# Patient Record
Sex: MALE | Race: BLACK OR AFRICAN AMERICAN | NOT HISPANIC OR LATINO | URBAN - METROPOLITAN AREA
[De-identification: names, ages, dates, MRNs, and addresses within clinical notes are randomized per-mention and may not be internally consistent; named-entity substitution may affect disease eponyms.]

---

## 2024-07-22 ENCOUNTER — CLINICAL SUPPORT (OUTPATIENT)
Dept: EMERGENCY MEDICINE | Facility: HOSPITAL | Age: 54
End: 2024-07-22

## 2024-07-22 ENCOUNTER — HOSPITAL ENCOUNTER (INPATIENT)
Facility: HOSPITAL | Age: 54
LOS: 1 days | Discharge: HOME | End: 2024-07-23
Attending: EMERGENCY MEDICINE | Admitting: PSYCHIATRY & NEUROLOGY

## 2024-07-22 ENCOUNTER — APPOINTMENT (OUTPATIENT)
Dept: RADIOLOGY | Facility: HOSPITAL | Age: 54
End: 2024-07-22

## 2024-07-22 ENCOUNTER — APPOINTMENT (OUTPATIENT)
Dept: RADIOLOGY | Facility: HOSPITAL | Age: 54
End: 2024-07-22
Payer: MEDICARE

## 2024-07-22 DIAGNOSIS — I63.531: ICD-10-CM

## 2024-07-22 DIAGNOSIS — I42.9 CARDIOMYOPATHY, UNSPECIFIED TYPE (MULTI): ICD-10-CM

## 2024-07-22 DIAGNOSIS — I63.9 ARTERIAL ISCHEMIC STROKE (MULTI): Primary | ICD-10-CM

## 2024-07-22 LAB
ALBUMIN SERPL BCP-MCNC: 4.1 G/DL (ref 3.4–5)
ALP SERPL-CCNC: 96 U/L (ref 33–120)
ALT SERPL W P-5'-P-CCNC: 6 U/L (ref 10–52)
ANION GAP BLDV CALCULATED.4IONS-SCNC: 12 MMOL/L (ref 10–25)
ANION GAP SERPL CALC-SCNC: 14 MMOL/L (ref 10–20)
APTT PPP: 27 SECONDS (ref 27–38)
AST SERPL W P-5'-P-CCNC: 13 U/L (ref 9–39)
BASE EXCESS BLDV CALC-SCNC: 2.4 MMOL/L (ref -2–3)
BASOPHILS # BLD AUTO: 0.04 X10*3/UL (ref 0–0.1)
BASOPHILS NFR BLD AUTO: 0.5 %
BILIRUB SERPL-MCNC: 1.2 MG/DL (ref 0–1.2)
BODY TEMPERATURE: 37 DEGREES CELSIUS
BUN SERPL-MCNC: 14 MG/DL (ref 6–23)
CA-I BLDV-SCNC: 1.16 MMOL/L (ref 1.1–1.33)
CALCIUM SERPL-MCNC: 9.4 MG/DL (ref 8.6–10.6)
CARDIAC TROPONIN I PNL SERPL HS: 35 NG/L (ref 0–53)
CHLORIDE BLDV-SCNC: 104 MMOL/L (ref 98–107)
CHLORIDE SERPL-SCNC: 105 MMOL/L (ref 98–107)
CO2 SERPL-SCNC: 23 MMOL/L (ref 21–32)
CREAT SERPL-MCNC: 1.69 MG/DL (ref 0.5–1.3)
EGFRCR SERPLBLD CKD-EPI 2021: 48 ML/MIN/1.73M*2
EOSINOPHIL # BLD AUTO: 0.07 X10*3/UL (ref 0–0.7)
EOSINOPHIL NFR BLD AUTO: 0.9 %
ERYTHROCYTE [DISTWIDTH] IN BLOOD BY AUTOMATED COUNT: 16.6 % (ref 11.5–14.5)
ERYTHROCYTE [SEDIMENTATION RATE] IN BLOOD BY WESTERGREN METHOD: 11 MM/H (ref 0–20)
GLUCOSE BLD MANUAL STRIP-MCNC: 93 MG/DL (ref 74–99)
GLUCOSE BLDV-MCNC: 94 MG/DL (ref 74–99)
GLUCOSE SERPL-MCNC: 93 MG/DL (ref 74–99)
HCO3 BLDV-SCNC: 27.2 MMOL/L (ref 22–26)
HCT VFR BLD AUTO: 39.8 % (ref 41–52)
HCT VFR BLD EST: 41 % (ref 41–52)
HGB BLD-MCNC: 13.4 G/DL (ref 13.5–17.5)
HGB BLDV-MCNC: 13.8 G/DL (ref 13.5–17.5)
IMM GRANULOCYTES # BLD AUTO: 0.01 X10*3/UL (ref 0–0.7)
IMM GRANULOCYTES NFR BLD AUTO: 0.1 % (ref 0–0.9)
INR PPP: 1 (ref 0.9–1.1)
LACTATE BLDV-SCNC: 1.4 MMOL/L (ref 0.4–2)
LYMPHOCYTES # BLD AUTO: 3.34 X10*3/UL (ref 1.2–4.8)
LYMPHOCYTES NFR BLD AUTO: 40.7 %
MCH RBC QN AUTO: 27.3 PG (ref 26–34)
MCHC RBC AUTO-ENTMCNC: 33.7 G/DL (ref 32–36)
MCV RBC AUTO: 81 FL (ref 80–100)
MONOCYTES # BLD AUTO: 0.87 X10*3/UL (ref 0.1–1)
MONOCYTES NFR BLD AUTO: 10.6 %
NEUTROPHILS # BLD AUTO: 3.87 X10*3/UL (ref 1.2–7.7)
NEUTROPHILS NFR BLD AUTO: 47.2 %
NRBC BLD-RTO: 0 /100 WBCS (ref 0–0)
OXYHGB MFR BLDV: 63.3 % (ref 45–75)
PCO2 BLDV: 42 MM HG (ref 41–51)
PH BLDV: 7.42 PH (ref 7.33–7.43)
PLATELET # BLD AUTO: 249 X10*3/UL (ref 150–450)
PO2 BLDV: 39 MM HG (ref 35–45)
POTASSIUM BLDV-SCNC: 3.7 MMOL/L (ref 3.5–5.3)
POTASSIUM SERPL-SCNC: 3.5 MMOL/L (ref 3.5–5.3)
PROT SERPL-MCNC: 7.8 G/DL (ref 6.4–8.2)
PROTHROMBIN TIME: 11.3 SECONDS (ref 9.8–12.8)
RBC # BLD AUTO: 4.9 X10*6/UL (ref 4.5–5.9)
SAO2 % BLDV: 64 % (ref 45–75)
SODIUM BLDV-SCNC: 139 MMOL/L (ref 136–145)
SODIUM SERPL-SCNC: 138 MMOL/L (ref 136–145)
WBC # BLD AUTO: 8.2 X10*3/UL (ref 4.4–11.3)

## 2024-07-22 PROCEDURE — 85730 THROMBOPLASTIN TIME PARTIAL: CPT | Performed by: STUDENT IN AN ORGANIZED HEALTH CARE EDUCATION/TRAINING PROGRAM

## 2024-07-22 PROCEDURE — 70450 CT HEAD/BRAIN W/O DYE: CPT | Mod: 59

## 2024-07-22 PROCEDURE — 71045 X-RAY EXAM CHEST 1 VIEW: CPT

## 2024-07-22 PROCEDURE — 85610 PROTHROMBIN TIME: CPT | Performed by: STUDENT IN AN ORGANIZED HEALTH CARE EDUCATION/TRAINING PROGRAM

## 2024-07-22 PROCEDURE — 85025 COMPLETE CBC W/AUTO DIFF WBC: CPT | Performed by: STUDENT IN AN ORGANIZED HEALTH CARE EDUCATION/TRAINING PROGRAM

## 2024-07-22 PROCEDURE — 70498 CT ANGIOGRAPHY NECK: CPT

## 2024-07-22 PROCEDURE — 84132 ASSAY OF SERUM POTASSIUM: CPT

## 2024-07-22 PROCEDURE — 82947 ASSAY GLUCOSE BLOOD QUANT: CPT | Mod: 59

## 2024-07-22 PROCEDURE — 82947 ASSAY GLUCOSE BLOOD QUANT: CPT

## 2024-07-22 PROCEDURE — 99291 CRITICAL CARE FIRST HOUR: CPT | Performed by: EMERGENCY MEDICINE

## 2024-07-22 PROCEDURE — 70498 CT ANGIOGRAPHY NECK: CPT | Performed by: RADIOLOGY

## 2024-07-22 PROCEDURE — 36415 COLL VENOUS BLD VENIPUNCTURE: CPT | Performed by: STUDENT IN AN ORGANIZED HEALTH CARE EDUCATION/TRAINING PROGRAM

## 2024-07-22 PROCEDURE — 93005 ELECTROCARDIOGRAM TRACING: CPT

## 2024-07-22 PROCEDURE — 84132 ASSAY OF SERUM POTASSIUM: CPT | Mod: 59 | Performed by: STUDENT IN AN ORGANIZED HEALTH CARE EDUCATION/TRAINING PROGRAM

## 2024-07-22 PROCEDURE — 84484 ASSAY OF TROPONIN QUANT: CPT | Performed by: STUDENT IN AN ORGANIZED HEALTH CARE EDUCATION/TRAINING PROGRAM

## 2024-07-22 PROCEDURE — 93010 ELECTROCARDIOGRAM REPORT: CPT | Performed by: EMERGENCY MEDICINE

## 2024-07-22 PROCEDURE — 2550000001 HC RX 255 CONTRASTS: Performed by: EMERGENCY MEDICINE

## 2024-07-22 PROCEDURE — 99291 CRITICAL CARE FIRST HOUR: CPT | Mod: 25 | Performed by: EMERGENCY MEDICINE

## 2024-07-22 PROCEDURE — 85652 RBC SED RATE AUTOMATED: CPT | Performed by: EMERGENCY MEDICINE

## 2024-07-22 PROCEDURE — 70496 CT ANGIOGRAPHY HEAD: CPT | Performed by: RADIOLOGY

## 2024-07-22 RX ADMIN — IOHEXOL 90 ML: 350 INJECTION, SOLUTION INTRAVENOUS at 22:02

## 2024-07-22 ASSESSMENT — PAIN SCALES - GENERAL
PAINLEVEL_OUTOF10: 3
PAINLEVEL_OUTOF10: 4

## 2024-07-22 ASSESSMENT — PAIN - FUNCTIONAL ASSESSMENT: PAIN_FUNCTIONAL_ASSESSMENT: 0-10

## 2024-07-22 ASSESSMENT — PAIN DESCRIPTION - PAIN TYPE: TYPE: ACUTE PAIN

## 2024-07-22 ASSESSMENT — PAIN DESCRIPTION - LOCATION: LOCATION: HEAD

## 2024-07-23 ENCOUNTER — PHARMACY VISIT (OUTPATIENT)
Dept: PHARMACY | Facility: CLINIC | Age: 54
End: 2024-07-23
Payer: COMMERCIAL

## 2024-07-23 ENCOUNTER — APPOINTMENT (OUTPATIENT)
Dept: CARDIOLOGY | Facility: HOSPITAL | Age: 54
End: 2024-07-23

## 2024-07-23 ENCOUNTER — APPOINTMENT (OUTPATIENT)
Dept: RADIOLOGY | Facility: HOSPITAL | Age: 54
End: 2024-07-23

## 2024-07-23 ENCOUNTER — CLINICAL SUPPORT (OUTPATIENT)
Dept: EMERGENCY MEDICINE | Facility: HOSPITAL | Age: 54
End: 2024-07-23

## 2024-07-23 VITALS
TEMPERATURE: 98.4 F | HEART RATE: 79 BPM | WEIGHT: 250 LBS | HEIGHT: 73 IN | RESPIRATION RATE: 18 BRPM | DIASTOLIC BLOOD PRESSURE: 115 MMHG | SYSTOLIC BLOOD PRESSURE: 165 MMHG | BODY MASS INDEX: 33.13 KG/M2 | OXYGEN SATURATION: 95 %

## 2024-07-23 PROBLEM — I26.99 PULMONARY EMBOLISM (MULTI): Status: ACTIVE | Noted: 2024-07-23

## 2024-07-23 PROBLEM — G45.3 AMAUROSIS FUGAX: Status: ACTIVE | Noted: 2024-07-23

## 2024-07-23 PROBLEM — I42.9 CARDIOMYOPATHY (MULTI): Status: ACTIVE | Noted: 2024-07-23

## 2024-07-23 PROBLEM — I69.351 HEMIPARESIS AFFECTING RIGHT SIDE AS LATE EFFECT OF CEREBROVASCULAR ACCIDENT (CVA) (MULTI): Status: ACTIVE | Noted: 2024-07-23

## 2024-07-23 PROBLEM — G45.9 TIA (TRANSIENT ISCHEMIC ATTACK): Status: ACTIVE | Noted: 2024-07-23

## 2024-07-23 PROBLEM — I69.920 APHASIA DUE TO LATE EFFECTS OF CEREBROVASCULAR DISEASE: Status: ACTIVE | Noted: 2024-07-23

## 2024-07-23 PROBLEM — I63.532: Status: ACTIVE | Noted: 2024-07-23

## 2024-07-23 PROBLEM — I69.398 SEIZURE, LATE EFFECT OF STROKE (MULTI): Status: ACTIVE | Noted: 2024-07-23

## 2024-07-23 PROBLEM — I10 HYPERTENSION: Status: ACTIVE | Noted: 2024-07-23

## 2024-07-23 PROBLEM — R56.9 SEIZURE, LATE EFFECT OF STROKE (MULTI): Status: ACTIVE | Noted: 2024-07-23

## 2024-07-23 PROBLEM — I63.9 ARTERIAL ISCHEMIC STROKE (MULTI): Status: ACTIVE | Noted: 2024-07-23

## 2024-07-23 LAB
ALBUMIN SERPL BCP-MCNC: 3.5 G/DL (ref 3.4–5)
ALP SERPL-CCNC: 81 U/L (ref 33–120)
ALT SERPL W P-5'-P-CCNC: 8 U/L (ref 10–52)
AORTIC VALVE MEAN GRADIENT: 2.8 MMHG
AORTIC VALVE PEAK VELOCITY: 1.15 M/S
AST SERPL W P-5'-P-CCNC: 19 U/L (ref 9–39)
AV PEAK GRADIENT: 5.3 MMHG
AVA (PEAK VEL): 2.42 CM2
AVA (VTI): 2.24 CM2
BILIRUB DIRECT SERPL-MCNC: 0.1 MG/DL (ref 0–0.3)
BILIRUB SERPL-MCNC: 1.1 MG/DL (ref 0–1.2)
BNP SERPL-MCNC: 112 PG/ML (ref 0–99)
CARDIAC TROPONIN I PNL SERPL HS: 36 NG/L (ref 0–53)
CHOLEST SERPL-MCNC: 149 MG/DL (ref 0–199)
CHOLESTEROL/HDL RATIO: 4.5
EJECTION FRACTION APICAL 4 CHAMBER: 56.8
EJECTION FRACTION: 33 %
EST. AVERAGE GLUCOSE BLD GHB EST-MCNC: 131 MG/DL
GLUCOSE BLD MANUAL STRIP-MCNC: 105 MG/DL (ref 74–99)
HBA1C MFR BLD: 6.2 %
HDLC SERPL-MCNC: 32.9 MG/DL
LDLC SERPL CALC-MCNC: 102 MG/DL
LEFT ATRIUM VOLUME AREA LENGTH INDEX BSA: 31.8 ML/M2
LEFT VENTRICLE INTERNAL DIMENSION DIASTOLE: 6.51 CM (ref 3.5–6)
LEFT VENTRICULAR OUTFLOW TRACT DIAMETER: 2.14 CM
MITRAL VALVE E/A RATIO: 0.47
NON HDL CHOLESTEROL: 116 MG/DL (ref 0–149)
PROT SERPL-MCNC: 6.9 G/DL (ref 6.4–8.2)
RIGHT VENTRICLE FREE WALL PEAK S': 0.14 CM/S
TRICUSPID ANNULAR PLANE SYSTOLIC EXCURSION: 2.7 CM
TRIGL SERPL-MCNC: 72 MG/DL (ref 0–149)
VLDL: 14 MG/DL (ref 0–40)

## 2024-07-23 PROCEDURE — 2500000004 HC RX 250 GENERAL PHARMACY W/ HCPCS (ALT 636 FOR OP/ED)

## 2024-07-23 PROCEDURE — 70544 MR ANGIOGRAPHY HEAD W/O DYE: CPT

## 2024-07-23 PROCEDURE — 36415 COLL VENOUS BLD VENIPUNCTURE: CPT

## 2024-07-23 PROCEDURE — 80076 HEPATIC FUNCTION PANEL: CPT

## 2024-07-23 PROCEDURE — 99223 1ST HOSP IP/OBS HIGH 75: CPT | Performed by: STUDENT IN AN ORGANIZED HEALTH CARE EDUCATION/TRAINING PROGRAM

## 2024-07-23 PROCEDURE — 70551 MRI BRAIN STEM W/O DYE: CPT

## 2024-07-23 PROCEDURE — 70547 MR ANGIOGRAPHY NECK W/O DYE: CPT

## 2024-07-23 PROCEDURE — 2500000001 HC RX 250 WO HCPCS SELF ADMINISTERED DRUGS (ALT 637 FOR MEDICARE OP)

## 2024-07-23 PROCEDURE — 70547 MR ANGIOGRAPHY NECK W/O DYE: CPT | Performed by: STUDENT IN AN ORGANIZED HEALTH CARE EDUCATION/TRAINING PROGRAM

## 2024-07-23 PROCEDURE — 2500000002 HC RX 250 W HCPCS SELF ADMINISTERED DRUGS (ALT 637 FOR MEDICARE OP, ALT 636 FOR OP/ED)

## 2024-07-23 PROCEDURE — 1200000002 HC GENERAL ROOM WITH TELEMETRY DAILY

## 2024-07-23 PROCEDURE — G0378 HOSPITAL OBSERVATION PER HR: HCPCS

## 2024-07-23 PROCEDURE — 83718 ASSAY OF LIPOPROTEIN: CPT

## 2024-07-23 PROCEDURE — 83880 ASSAY OF NATRIURETIC PEPTIDE: CPT

## 2024-07-23 PROCEDURE — 84484 ASSAY OF TROPONIN QUANT: CPT

## 2024-07-23 PROCEDURE — 71045 X-RAY EXAM CHEST 1 VIEW: CPT | Performed by: RADIOLOGY

## 2024-07-23 PROCEDURE — RXMED WILLOW AMBULATORY MEDICATION CHARGE

## 2024-07-23 PROCEDURE — 70551 MRI BRAIN STEM W/O DYE: CPT | Performed by: STUDENT IN AN ORGANIZED HEALTH CARE EDUCATION/TRAINING PROGRAM

## 2024-07-23 PROCEDURE — 83036 HEMOGLOBIN GLYCOSYLATED A1C: CPT

## 2024-07-23 PROCEDURE — 93005 ELECTROCARDIOGRAM TRACING: CPT

## 2024-07-23 PROCEDURE — 93306 TTE W/DOPPLER COMPLETE: CPT | Performed by: INTERNAL MEDICINE

## 2024-07-23 PROCEDURE — 93306 TTE W/DOPPLER COMPLETE: CPT

## 2024-07-23 PROCEDURE — 82947 ASSAY GLUCOSE BLOOD QUANT: CPT | Mod: 59

## 2024-07-23 RX ORDER — SENNOSIDES 8.6 MG/1
2 TABLET ORAL 2 TIMES DAILY
Status: CANCELLED | OUTPATIENT
Start: 2024-07-23

## 2024-07-23 RX ORDER — LISINOPRIL 20 MG/1
40 TABLET ORAL DAILY
Status: DISCONTINUED | OUTPATIENT
Start: 2024-07-23 | End: 2024-07-23

## 2024-07-23 RX ORDER — LISINOPRIL 40 MG/1
40 TABLET ORAL DAILY
COMMUNITY

## 2024-07-23 RX ORDER — ENOXAPARIN SODIUM 100 MG/ML
40 INJECTION SUBCUTANEOUS EVERY 24 HOURS
Status: DISCONTINUED | OUTPATIENT
Start: 2024-07-23 | End: 2024-07-23

## 2024-07-23 RX ORDER — ASPIRIN 81 MG/1
81 TABLET ORAL DAILY
Status: CANCELLED | OUTPATIENT
Start: 2024-07-24

## 2024-07-23 RX ORDER — CARVEDILOL 3.12 MG/1
3.12 TABLET ORAL 2 TIMES DAILY
Status: DISCONTINUED | OUTPATIENT
Start: 2024-07-23 | End: 2024-07-23

## 2024-07-23 RX ORDER — ATORVASTATIN CALCIUM 20 MG/1
40 TABLET, FILM COATED ORAL NIGHTLY
Status: CANCELLED | OUTPATIENT
Start: 2024-07-23

## 2024-07-23 RX ORDER — NAPROXEN SODIUM 220 MG/1
81 TABLET, FILM COATED ORAL DAILY
Status: DISCONTINUED | OUTPATIENT
Start: 2024-07-23 | End: 2024-07-23

## 2024-07-23 RX ORDER — ALBUTEROL SULFATE 90 UG/1
2 INHALANT RESPIRATORY (INHALATION) EVERY 4 HOURS PRN
COMMUNITY
Start: 2024-01-25

## 2024-07-23 RX ORDER — PAROXETINE HYDROCHLORIDE 20 MG/1
20 TABLET, FILM COATED ORAL DAILY
COMMUNITY

## 2024-07-23 RX ORDER — ATORVASTATIN CALCIUM 20 MG/1
40 TABLET, FILM COATED ORAL NIGHTLY
Status: DISCONTINUED | OUTPATIENT
Start: 2024-07-23 | End: 2024-07-23

## 2024-07-23 RX ORDER — NAPROXEN SODIUM 220 MG/1
81 TABLET, FILM COATED ORAL
Status: DISCONTINUED | OUTPATIENT
Start: 2024-07-23 | End: 2024-07-23 | Stop reason: HOSPADM

## 2024-07-23 RX ORDER — CARVEDILOL 3.12 MG/1
1 TABLET ORAL 2 TIMES DAILY
COMMUNITY

## 2024-07-23 RX ORDER — AMLODIPINE BESYLATE 10 MG/1
10 TABLET ORAL DAILY
COMMUNITY

## 2024-07-23 RX ORDER — FLUTICASONE FUROATE AND VILANTEROL TRIFENATATE 200; 25 UG/1; UG/1
1 POWDER RESPIRATORY (INHALATION) DAILY
COMMUNITY
Start: 2024-01-25

## 2024-07-23 RX ORDER — RIVAROXABAN 20 MG/1
20 TABLET, FILM COATED ORAL
Qty: 30 TABLET | Refills: 11 | Status: SHIPPED | OUTPATIENT
Start: 2024-07-23 | End: 2024-12-20 | Stop reason: HOSPADM

## 2024-07-23 RX ORDER — ASPIRIN 81 MG/1
81 TABLET ORAL DAILY
Status: DISCONTINUED | OUTPATIENT
Start: 2024-07-23 | End: 2024-07-23

## 2024-07-23 RX ORDER — LABETALOL HYDROCHLORIDE 5 MG/ML
10 INJECTION, SOLUTION INTRAVENOUS EVERY 10 MIN PRN
Status: DISCONTINUED | OUTPATIENT
Start: 2024-07-23 | End: 2024-07-23 | Stop reason: HOSPADM

## 2024-07-23 RX ORDER — HYDRALAZINE HYDROCHLORIDE 20 MG/ML
10 INJECTION INTRAMUSCULAR; INTRAVENOUS
Status: DISCONTINUED | OUTPATIENT
Start: 2024-07-23 | End: 2024-07-23 | Stop reason: HOSPADM

## 2024-07-23 RX ORDER — HYDRALAZINE HYDROCHLORIDE 25 MG/1
25 TABLET, FILM COATED ORAL EVERY 6 HOURS PRN
Status: DISCONTINUED | OUTPATIENT
Start: 2024-07-25 | End: 2024-07-23 | Stop reason: HOSPADM

## 2024-07-23 RX ORDER — PAROXETINE HYDROCHLORIDE 20 MG/1
20 TABLET, FILM COATED ORAL DAILY
Status: DISCONTINUED | OUTPATIENT
Start: 2024-07-23 | End: 2024-07-23 | Stop reason: HOSPADM

## 2024-07-23 RX ORDER — ATORVASTATIN CALCIUM 40 MG/1
40 TABLET, FILM COATED ORAL DAILY
COMMUNITY
End: 2024-12-20 | Stop reason: HOSPADM

## 2024-07-23 RX ORDER — RIVAROXABAN 20 MG/1
20 TABLET, FILM COATED ORAL
COMMUNITY
End: 2024-07-23

## 2024-07-23 RX ORDER — AMOXICILLIN 250 MG
2 CAPSULE ORAL 2 TIMES DAILY
Status: DISCONTINUED | OUTPATIENT
Start: 2024-07-23 | End: 2024-07-23 | Stop reason: HOSPADM

## 2024-07-23 RX ORDER — ATORVASTATIN CALCIUM 20 MG/1
40 TABLET, FILM COATED ORAL DAILY
Status: DISCONTINUED | OUTPATIENT
Start: 2024-07-23 | End: 2024-07-23 | Stop reason: HOSPADM

## 2024-07-23 RX ORDER — GABAPENTIN 300 MG/1
300 CAPSULE ORAL 3 TIMES DAILY
COMMUNITY
Start: 2024-01-26

## 2024-07-23 RX ORDER — NAPROXEN SODIUM 220 MG/1
1 TABLET, FILM COATED ORAL
COMMUNITY
Start: 2024-01-25

## 2024-07-23 RX ORDER — AMLODIPINE BESYLATE 5 MG/1
10 TABLET ORAL DAILY
Status: DISCONTINUED | OUTPATIENT
Start: 2024-07-23 | End: 2024-07-23

## 2024-07-23 RX ORDER — GABAPENTIN 300 MG/1
300 CAPSULE ORAL 3 TIMES DAILY
Status: DISCONTINUED | OUTPATIENT
Start: 2024-07-23 | End: 2024-07-23 | Stop reason: HOSPADM

## 2024-07-23 RX ORDER — ENOXAPARIN SODIUM 100 MG/ML
40 INJECTION SUBCUTANEOUS EVERY 24 HOURS
Status: CANCELLED | OUTPATIENT
Start: 2024-07-23

## 2024-07-23 RX ADMIN — ATORVASTATIN CALCIUM 40 MG: 20 TABLET, FILM COATED ORAL at 08:34

## 2024-07-23 RX ADMIN — PERFLUTREN 3 ML OF DILUTION: 6.52 INJECTION, SUSPENSION INTRAVENOUS at 13:57

## 2024-07-23 RX ADMIN — PAROXETINE 20 MG: 20 TABLET, FILM COATED ORAL at 10:45

## 2024-07-23 RX ADMIN — GABAPENTIN 300 MG: 300 CAPSULE ORAL at 15:20

## 2024-07-23 RX ADMIN — ASPIRIN 81 MG 81 MG: 81 TABLET ORAL at 06:32

## 2024-07-23 RX ADMIN — CARVEDILOL 3.12 MG: 3.12 TABLET, FILM COATED ORAL at 03:53

## 2024-07-23 RX ADMIN — GABAPENTIN 300 MG: 300 CAPSULE ORAL at 08:34

## 2024-07-23 ASSESSMENT — LIFESTYLE VARIABLES
HAVE YOU EVER FELT YOU SHOULD CUT DOWN ON YOUR DRINKING: NO
TOTAL SCORE: 0
EVER FELT BAD OR GUILTY ABOUT YOUR DRINKING: NO
HAVE PEOPLE ANNOYED YOU BY CRITICIZING YOUR DRINKING: NO
EVER HAD A DRINK FIRST THING IN THE MORNING TO STEADY YOUR NERVES TO GET RID OF A HANGOVER: NO

## 2024-07-23 ASSESSMENT — PAIN SCALES - GENERAL: PAINLEVEL_OUTOF10: 4

## 2024-07-23 ASSESSMENT — PAIN - FUNCTIONAL ASSESSMENT: PAIN_FUNCTIONAL_ASSESSMENT: 0-10

## 2024-07-23 ASSESSMENT — PAIN DESCRIPTION - LOCATION: LOCATION: LEG

## 2024-07-23 ASSESSMENT — PAIN DESCRIPTION - DESCRIPTORS: DESCRIPTORS: PRESSURE

## 2024-07-23 ASSESSMENT — PAIN DESCRIPTION - ORIENTATION: ORIENTATION: RIGHT

## 2024-07-23 ASSESSMENT — PAIN DESCRIPTION - PAIN TYPE: TYPE: ACUTE PAIN

## 2024-07-23 NOTE — CONSULTS
"    Subjective     HPI  Michael Reed is a 54 y.o. Right-handed male with PMH of a L PCA infarct (w/ chronic R HH, R sided hemiplegia), Factor V Leiden (on xarelto), HTN, and HLD who presented to [facility] on 7/22/2024 with worsening vision, neurology consulted for concern as a BAT for a potential new stroke. The patient was LKW at approximately 2015 today when he was sitting on the porch. He is a relatively poor historian and is unable to clearly articulate his symptoms, with different conflicting statements. However, on further questioning he does seem to say that he had the sudden onset of \"total blackness\" in BOTH of his eyes that lasted for several minutes. He has had a R sided temporally-localized headache that started at this same time. His vision overall returned, but following this he says his vision is \"patchy\" and that he can \"only see the left side\" of the provider; when asked if this is how is vision normally is, he does state that this has been present since 2017. When asked to elaborate further what his new deficit is, he continues to just state that his vision is \"patchy,\" but is unable to describe beyond this. He says that he cannot see entirely out of his R eye at times, then at other times denies this. While being examined initially for the BAT, he actually had INTACT L visual field in both eyes, then later demonstrated loss of vision in only his L upper quadrant of his R eye, then later complete loss of vision in his R eye to finger counting, yet blinks to threat, fixates appropriately (see exam below). On interview, he does confirm that he has minimal strength in his RUE and moderate weakness in his RLE with sensory loss at baseline. He denies jaw claudication, fevers, episodes of prior severe joint pains.    NIHSS was 8 (2 Visual, 3 RUE, 3 RLE), BG was 93, BP was 185/115. CTH and CTA were done and personally reviewed, which are notable for L PCA territory encephalomalacia, no new hypodensities or " ICH compared to prior imaging, and no LVO.     Past Medical History  Past Medical History:   Diagnosis Date    Cardiomegaly 03/11/2015    Cardiomegaly    Essential (primary) hypertension 06/18/2015    Hypertension    Other pulmonary embolism without acute cor pulmonale (Multi) 06/18/2015    Pulmonary embolism     Surgical History  Past Surgical History:   Procedure Laterality Date    CT ABDOMEN PELVIS ANGIOGRAM W AND/OR WO IV CONTRAST  12/6/2016    CT ABDOMEN PELVIS ANGIOGRAM W AND/OR WO IV CONTRAST 12/6/2016 [facility] CLINICAL LEGACY    MR HEAD ANGIO WO IV CONTRAST  4/30/2017    MR HEAD ANGIO WO IV CONTRAST 4/30/2017 [facility] CLINICAL LEGACY    MR HEAD ANGIO WO IV CONTRAST  12/6/2016    MR HEAD ANGIO WO IV CONTRAST 12/6/2016 [facility] CLINICAL LEGACY    MR NECK ANGIO WO IV CONTRAST  4/30/2017    MR NECK ANGIO WO IV CONTRAST 4/30/2017 [facility] CLINICAL LEGACY    MR NECK ANGIO WO IV CONTRAST  12/6/2016    MR NECK ANGIO WO IV CONTRAST 12/6/2016 [facility] CLINICAL LEGACY     Social History     Allergies  Patient has no allergy information on record.    Home Medications  No current outpatient medications        Objective   24h Vitals  Heart Rate:  [88-94]   Respirations:  [19-20]   BP: (185-195)/(115-140)   Pulse Ox:  [92 %-97 %]      Physical Exam  NEUROLOGIC EXAM:    MENTAL STATUS:  - Alert and oriented to person, place, and time  - Recall of recent events intact.  - Speech is fluent. Follows complex commands. Able to identify 3 objects.    CRANIAL NERVES:  - CN I: Not Assessed  - CN II: R HH present, *Conflicting exam findings for R Eye: 1) Initially intact to finger counting in LUQ and LLQ, 2) Later demonstrated LUQ R eye vision loss only, 3) Then Inability to finger count in all quadrants only in R eye  - CN III, IV, VI: Extraocular movements intact without nystagmus  - CN V: Decreased R hemifacial sensation to light touch  - CN VII: No facial droop, closes eyes against resistance  - CN VIII: Hearing intact to voice  - CN IX, X: Palate  "elevates symmetrically  - CN XII: Tongue midline without atrophy or fasciculations    MOTOR:  - No tremors or abnormal movements  - Normal bulk and tone throughout  - Strength: RUE: 2/5, RLE 3/5, LUE 5/5, LLE 5/5    REFLEXES:   L                R  Biceps            +2               +2  Brachioradialis+2               +2  Patellar            +2               +2  Achilles +2               +2    COORDINATION: Finger to nose intact in LUE, unable to assess due to hemiplegia in RUE  SENSATION: Decreased sensation to light touch in LUE and LLE  GAIT: Can stand with 1 person assist    NIHSS  Level of consciousness: 0 = Alert  LOC Question: 0 = Both correct  LOC Commands: 0 = Obeys both  Best Gaze: 0 = Normal  Visual Field: 2 = Complete HH  Facial Paresis: 0 = Normal  LUE: 0 = No drift; 10 sec  RUE: 3 = No effort vs gravity  LLE: 0 = No drift; 5 sec  RLE: 3 = No effort vs gravity  Dysarthria: 0 = Normal  Best Language: 0 = No aphasia  Limb Ataxia: 0 = Absent  Sensory: 0 = Absent  Neglect: 0 = None  NIHSS Score: 8     Recent Labs        No lab exists for component: \"PHOSPHORUS\"              No results found for: \"HGBA1C\", \"LDLF\"     Imaging  CT head results: No CT head results found for the past 12 months  Brain vessel imaging results: No brain vessel imaging results found for the past 12 months        IV Thrombolysis IV Thrombolysis Checklist      IV Thrombolysis Given: No; Thrombolysis contraindication reason: Working diagnosis is NOT a suspected ischemic stroke    Assessment/Plan   Michael Reed is a 54 y.o. Right-handed male with PMH of a L PCA infarct (w/ chronic R HH, R sided hemiplegia), Factor V Leiden (on xarelto), HTN, and HLD who presented to [facility] on 7/22/2024 with worsening vision, neurology consulted for concern as a BAT for a potential new stroke. NIHSS was 8 for chronic findings. On history and reexamination, the patient demonstrated conflicting findings; however, he is now demonstrating R eye monocular LUQ " "vision loss and reporting a subjective, vague sense of whole vision \"patchiness.\" His CTH/CTA were negative for any acute findings. He does have an associated headache. This would not be typical for an infarct, but there a CRAO could be considered (would not explain patient's ability to still fixate, blink to threat, and initially finger count normally). The differential would also include GCA given headache, age of presentation.     Type: Unknown  Subtype: Undetermined etiology  Vessels Involved:  Working diagnosis is less likely a stroke  Neurologic Manifestations: NIHSS 8 (Chronic)  Deficits: NIHSS 8  Initial treatment: None  Anti-platelets or Anti-coagulation management: Aspirin  Vascular Risk Factors: HTN and HLD  BP goal: Normotension  Disposition:  Pending work-up      Recommendations:  Recommend ESR/CRP  Recommend consideration of ophthalmology consult given monocular findings  No MRI indicated at this time  ASA 81mg daily  Atorvastatin 40mg  sBP goal Normotension    Vascular Risk Factor modification goals:  Blood pressure goals: avoid hypotension SBP <100 that could worsen cerebral perfusion,  Normotension  Lipid Goals: education on healthy diet and statin therapy to maintain or achieve goal LDL-cholesterol < 70mg  Glucose Goals: early treatment of hyperglycemia to goal glucose 140-180 mg/dl with long-term goal A1c < 7%   Smoking Cessation and Education  Assessment for Rehabilitation needs including PT/OT and if indicated swallow evaluation and speech therapy   Patient and family education on signs and symptoms of stroke, calling 911, risk factors, and healthy strategies for stroke prevention.      Andrzej Longo MD  PGY-4 Neurology  Stroke [phone]    Recommendations are not final until formally staffed with the attending physician, Dr. Gudino.      "

## 2024-07-23 NOTE — PROGRESS NOTES
Disposition Note  [facility] CLINICAL DECISION    Date of Placement in CDU: 07/23/24  Time of Disposition: 6:45 AM     Samy Reed is a 54 y.o. male whom has undergone comprehensive diagnostic evaluation and therapeutic management in accordance with the CDU guidelines for TIA/amaurosis fugax. Based on the patient's clinical response and diagnostic information during this period of observation, it has been determined that the patient will admitted to neuro-stroke.      Physical Exam  Vital signs reviewed and noted no distress. Afebrile.   General: Patient is awake, conversant, well-nourished and overall well-appearing.    Head: NC/AT. No apparent traumatic injuries.  Temporal arteries are nontender to palpation.  Symmetric facies.   Eyes: EOMI, sclerae anicteric    ENT: Hearing grossly intact. TMs clear. MMM without lesions. Posterior oropharynx unremarkable. Normal phonation without stridor, drooling or trismus.   Neck: Supple, full ROM without meningismus. No LAD.  Trachea midline, thyroid normal.   Cardiac: Regular rate & rhythm. No murmur, gallops or rubs.    Lungs: CTAB with normal respiratory effort and good aeration throughout. No accessory muscle usage or adventitious breath sounds. Chest wall is non-tender to palpation.    Abdomen: Soft, non-tender, nonsurgical. No masses. No rebound, rigidity or guarding. Normoactive B   MSK: Full ROM intact. Symmetric muscle bulk without step-offs or gross deformity.   Vascular: Pulses full and equal bilaterally. No cyanosis, clubbing or pitting LE edema. Capillary refill < 2s   Skin: Warm and intact without rashes, lesions or discoloration. Turgor is good.   Neuro: CN II-XII grossly intact. A&Ox3. Speech is clear and fluent.  Ambulates with cane for assistance, gait is non-ataxic.  Muscle strength 5 out of 5 in bilateral upper and lower extremities.  Motor function and sensation are also intact with residual right-sided hemiplegia from old  "stroke.  No focal deficits identified.   Psychiatric: Mood and affect are appropriate for situation.    Diagnostic Evaluation  Diagnostic studies germane to this period of clinical observation include:      Labs    Results from last 72 hours   Lab Units 07/22/24  2209   WBC AUTO x10*3/uL 8.2   RBC AUTO x10*6/uL 4.90   HEMATOCRIT % 39.8*   MCV fL 81   MCH pg 27.3   RDW % 16.6*   PLATELETS AUTO x10*3/uL 249   NEUTROS ABS x10*3/uL 3.87     Results from last 72 hours   Lab Units 07/22/24  2209   GLUCOSE mg/dL 93   SODIUM mmol/L 138   POTASSIUM mmol/L 3.5   CHLORIDE mmol/L 105   CO2 mmol/L 23   ANION GAP mmol/L 14   BUN mg/dL 14   CREATININE mg/dL 1.69*   EGFR mL/min/1.73m*2 48*   CALCIUM mg/dL 9.4      Results from last 72 hours   Lab Units 07/22/24  2209   ALK PHOS U/L 96   BILIRUBIN TOTAL mg/dL 1.2   PROTEIN TOTAL g/dL 7.8   ALT U/L 6*   AST U/L 13     Results from last 72 hours   Lab Units 07/22/24  2209   APTT seconds 27   INR  1.0       Results from last 72 hours   Lab Units 07/22/24  2209   TROPHSCMC ng/L 35      Results from last 72 hours   Lab Units 07/23/24  0551 07/22/24  2136   POCT GLUCOSE mg/dL  --  93   HEMOGLOBIN A1C % 6.2*  --       Results from last 72 hours   Lab Units 07/22/24  2210   POCT PH, VENOUS pH 7.42   POCT PCO2, VENOUS mm Hg 42   POCT PO2, VENOUS mm Hg 39   POCT SO2, VENOUS % 64   POCT HEMATOCRIT CALCULATED, VENOUS % 41.0   POCT SODIUM, VENOUS mmol/L 139   POCT POTASSIUM, VENOUS mmol/L 3.7   POCT CHLORIDE, VENOUS mmol/L 104   POCT IONIZED CALCIUM, VENOUS mmol/L 1.16   POCT GLUCOSE, VENOUS mg/dL 94   POCT LACTATE, VENOUS mmol/L 1.4   POCT BASE EXCESS, VENOUS mmol/L 2.4   POCT HCO3 CALCULATED, VENOUS mmol/L 27.2*   POCT HEMOGLOBIN, VENOUS g/dL 13.8   POCT ANION GAP, VENOUS mmol/L 12.0        Results from last 72 hours   Lab Units 07/22/24  2209   SED RATE mm/h 11            No lab exists for component: \"MOTOAB\"     Urine                  Microbiology                Imaging  XR chest 1 view "   Final Result   No radiographic evidence of acute cardiopulmonary pathology.        MACRO:   None.        Signed by: Evan Finkelstein 7/23/2024 12:42 AM   Dictation workstation:   [id number]      CT brain attack angio head and neck W and WO IV contrast   Final Result   Dominant right vertebral artery. Diminutive caliber of the left   vertebral artery is most pronounced at the V4 segment with return to   normal caliber distally. Otherwise no significant stenosis or   occlusion throughout the neck vasculature. No occlusion or severe   stenosis throughout the intracranial vessels        I personally reviewed the images/study and I agree with the findings   as stated by Kvng Birch MD. This study was interpreted at   [place], [place].        The above findings were communicated to Dr. Tamir Ball at 10:30 pm   via epic chat.        MACRO:   None.        Signed by: Evan Finkelstein 7/22/2024 10:44 PM   Dictation workstation:   [id number]      CT brain attack head wo IV contrast   Final Result   No acute intracranial hemorrhage, mass effect or midline shift.   Similar-appearing old infarcts and scattered white matter   hypodensities which may represent sequela of small vessel ischemia.   Consider further evaluation with MRI if there is persistent clinical   concern for acute ischemia.        I personally reviewed the images/study and I agree with the findings   as stated by Kvng Birch MD. This study was interpreted at   [facility], [place], [place].        MACRO:   None.        Signed by: Evan Finkelstein 7/22/2024 10:38 PM   Dictation workstation:   [id number]      Point of Care Ultrasound    (Results Pending)   Transthoracic Echo (TTE) Complete    (Results Pending)   MR brain wo IV contrast    (Results Pending)   MR angio neck wo IV contrast    (Results Pending)   MR angio head wo IV contrast    (Results Pending)   Transthoracic  Echo (TTE) Complete    (Results Pending)           Consultant(s) Final Recommendations (as applicable)  Neurology/Stroke  Final recommendation still pending  Advised continued cardioembolic work-up  Resume home Xarelto    Ophthomology  #Transient vision loss, both eyes  #Subjective visual field loss, both eyes  - Vision loss complete blackness ~ 30 minutes duration  - Patient with multiple vascular RF including hx of prior stroke, Factor V Leiden (pt unsure if he is taking properly), HTN, HLD  - CT and CTA head/neck completed without evidence of significant new stenosis or occlusion   - Neurology recommendations  - Presentation concerning for neurovascular etiology as would be unusual for bilateral ICA or ophthalmic artery occlusions. Would work up as amaurosis. Current vision I suspect is at baseline for patient with baseline visual field defects, although has not had formal testing.   - Labs: CBC, lipids, glucose, A1c  - Recommend close outpatient follow up with PCP/neurology for management of vascular risk factors for stroke and medication management     #Optic nerve cupping, both eyes  - Normal intraocular pressure (IOP) with deep AC   - Ddx glaucomatous changes, thinning related to CVA  - Recommend outpatient formal visual field (VF), routine glaucoma evaluation    Recommend follow-up with  [facility], within 2-3. Please call [phone] ([phone]) to make appointment.     Thank you for the consult. Please contact the Ophthalmology service with further questions or concerns.    Impression and Plan  Michael Reed  has been cared for according to the standard [facility] Clinical Decision Unit observation protocol for TIA/amaurosis fugax. This extended period of observation was specifically required to determine the need for hospitalization. Prior to discharge from observation, the final physical exam is documented above.     Significant events during the course of observation based on  the goals of the clinical problem list include:   Radiology called to inform primary care team (CDU) that patient's MRI demonstrated evidence of acute small right occipital infarct. Final MRI results are still pending, however given critical finding in setting of intermittently recurrent monocular vision loss, patient no longer meets CDU criteria and will require admission to neuro-stroke for further management and completion of cardioembolic work-up.  Patient remained hypertensive though his blood pressure did improve slightly, now 154/126, after adminstration of home medications.  He received 81 mg ASA this morning prior to admission to neuro-stroke.    Based on the patient's condition and test results, the patient will be admitted to neuro-stroke for further management and completion of cardioembolic work-up.    Total length of observation was 4  hours. Dr. Lopez is the CDU disposition attending.    The patient will follow up with:  Neurology  Ophthmology    As appropriate, please see the Exit Care module for comprehensive discharge instructions.    Diamante Jeffrey PA-C   [facility]  Emergency Department

## 2024-07-23 NOTE — H&P
History and Physical  [facility] CLINICAL DECISION UNIT    MRN: [id number]  Date of Placement in CDU: 7/23/2024  Time Placed in Observation: 6:13 AM  ED Provider: Diamante Jeffrey PA-C      Limitations to history: poor historian   Independent Historians: patient   External Records Reviewed: EMR, outside records, Care-everywhere     Patient History   Michael Reed is a 54 y.o. Right-handed male with PMH of a L PCA infarct (w/ chronic R HH, R sided hemiplegia), Factor V Leiden (on xarelto), HTN, and HLD whom presented to the ED on 07/22/24 as BAT activation for worsening visual disturbances and right-sided temporal headache concerning for stroke. Patient reportedly developed sudden onset total blackness vision loss in BOTH eyes which lasted for several miniutes before vision returned to normal. LKW was 2015 yesterday evening.     While in the ED, patient remained afebrile and vital signs were stable. The acute evaluation included  CTH and CTA which demonstrated prior L PCA territory encephalomalacia without any acute hypodensities or ICH and no LVO. Initial laboratory work-up was similarly negative without any evidence underlying infectious, marked electrolyte derangements, MASON, anemia. BNP and troponin also WNL. Patient was elevated by ophthalmology. After discussion with the ED provider, it was elected that the patient be placed in the CDU for TIA/Amaurosis Fugax.      Upon admission to the Clinical Decision Unit, patient ist still hypertensive to 172/134 but otherwise stable and in no acute distress. NIHSS at this time is 8 for chronic residual neuro deficits from prior strokes. Nothing acute. On my evaluation, he is ambulatory with nonataxic gait using cane for assistance. Denies any residual neurological symptoms or visual disturbances aside from the right-sided temporal headache. No thunderclap component.     The acute ED evaluation included:  Orders Placed This Encounter   Procedures    CT brain  attack head wo IV contrast    CT brain attack angio head and neck W and WO IV contrast    XR chest 1 view    Point of Care Ultrasound    MR brain wo IV contrast    MR angio neck wo IV contrast    MR angio head wo IV contrast    CBC and Auto Differential    Comprehensive metabolic panel    Troponin I, High Sensitivity    Protime-INR    APTT    Sedimentation rate, automated    Hemoglobin A1C    Lipid Panel    Hepatic Function Panel    B-Type Natriuretic Peptide    NPO Diet; Effective midnight    Adult diet Carb Controlled; 60 gram carb/meal, 30 gram Carb evening snack    NPO Diet; Effective midnight    Vital Signs    Neuro checks    Cardiac Monitoring - ED/PACU Only    Nursing swallow assessment    NIH Stroke Scale assessment by physician    Vital Signs    Notify provider (specify parameters)    Neuro checks/Neurological status    Nursing stroke/TIA swallow screen    Ischemic stroke CPG    Cardiac Monitoring Lead and Site Care    Telemetry monitoring - Floor only    Place sequential compression device    Vital Signs    Notify provider (specify parameters)    Neuro checks/Neurological status    Nursing stroke/TIA swallow screen    Ischemic stroke CPG    Cardiac Monitoring Lead and Site Care    Place sequential compression device    Activity (specify) Ambulate    Full code    Inpatient consult to ophthalmology    Pulse oximetry, continuous    POCT GLUCOSE    ECG 12 lead    Electrocardiogram, 12-lead    Transthoracic Echo (TTE) Complete    Insert and maintain peripheral IV    Send to CDU    Initiate observation status         Past Medical History: reviewed, see EMR and HPI  Past Surgical History: reviewed, see EMR  Social History: No tobacco use. Denies EtOH and illict substance use.  Family History: Non-contributory  Allergies: reviewed      Medications: reviewed  Previous Medications    ALBUTEROL 90 MCG/ACTUATION INHALER    Inhale 2 puffs every 4 hours if needed for wheezing or shortness of breath.    AMLODIPINE  (NORVASC) 10 MG TABLET    Take 1 tablet (10 mg) by mouth once daily.    ASPIRIN 81 MG CHEWABLE TABLET    Chew 1 tablet (81 mg) early in the morning..    ATORVASTATIN (LIPITOR) 40 MG TABLET    Take 1 tablet (40 mg) by mouth once daily.    BREO ELLIPTA 200-25 MCG/DOSE INHALER    Inhale 1 puff once daily.    CARVEDILOL (COREG) 3.125 MG TABLET    Take 1 tablet (3.125 mg) by mouth 2 times a day.    GABAPENTIN (NEURONTIN) 300 MG CAPSULE    Take 1 capsule (300 mg) by mouth 3 times a day.    LISINOPRIL 40 MG TABLET    Take 1 tablet (40 mg) by mouth once daily.    PAROXETINE (PAXIL) 20 MG TABLET    Take 1 tablet (20 mg) by mouth once daily.    XARELTO 20 MG TABLET    Take 1 tablet (20 mg) by mouth once daily in the evening. Take with meals.       Scheduled medications  amLODIPine, 10 mg, oral, Daily  aspirin, 81 mg, oral, Daily  atorvastatin, 40 mg, oral, Daily  carvedilol, 3.125 mg, oral, BID  lisinopril, 40 mg, oral, Daily  rivaroxaban, 20 mg, oral, Daily with evening meal      Continuous medications     PRN medications         Review of Systems   All systems reviewed and otherwise negative, except as stated above in HPI.      Physical Examination     Visit Vitals  BP (!) 154/126   Pulse 86   Resp 16   SpO2 97%       VS reviewed and noted NAD. Afebrile.   General: Patient is awake, conversant, well-nourished and overall well-appearing.    Head: NC/AT. No apparent traumatic injuries.  Temporal arteries are nontender to palpation.  Symmetric facies.   Eyes: EOMI, sclerae anicteric    ENT: Hearing grossly intact. TMs clear. MMM without lesions. Posterior oropharynx unremarkable. Normal phonation without stridor, drooling or trismus.   Neck: Supple, full ROM without meningismus. No LAD.  Trachea midline, thyroid normal.   Cardiac: Regular rate & rhythm. No murmur, gallops or rubs.    Lungs: CTAB with normal respiratory effort and good aeration throughout. No accessory muscle usage or adventitious breath sounds. Chest wall is  non-tender to palpation.    Abdomen: Soft, non-tender, nonsurgical. No masses. No rebound, rigidity or guarding. Normoactive B   MSK: Full ROM intact. Symmetric muscle bulk without step-offs or gross deformity.   Vascular: Pulses full and equal bilaterally. No cyanosis, clubbing or pitting LE edema. Capillary refill < 2s   Skin: Warm and intact without rashes, lesions or discoloration. Turgor is good.   Neuro: CN II-XII grossly intact. A&Ox3. Speech is clear and fluent.  Ambulates with cane for assistance, gait is non-ataxic.  Muscle strength 5 out of 5 in bilateral upper and lower extremities.  Motor function and sensation are also intact with residual right-sided hemiplegia from old stroke.  No focal deficits identified.   Psychiatric: Mood and affect are appropriate for situation.      Diagnostic Evaluation   I reviewed the below labs and imaging as ordered by the ED provider:     Labs    Results from last 72 hours   Lab Units 07/22/24 2209   WBC AUTO x10*3/uL 8.2   RBC AUTO x10*6/uL 4.90   HEMATOCRIT % 39.8*   MCV fL 81   MCH pg 27.3   RDW % 16.6*   PLATELETS AUTO x10*3/uL 249   NEUTROS ABS x10*3/uL 3.87     Results from last 72 hours   Lab Units 07/22/24 2209   GLUCOSE mg/dL 93   SODIUM mmol/L 138   POTASSIUM mmol/L 3.5   CHLORIDE mmol/L 105   CO2 mmol/L 23   ANION GAP mmol/L 14   BUN mg/dL 14   CREATININE mg/dL 1.69*   EGFR mL/min/1.73m*2 48*   CALCIUM mg/dL 9.4      Results from last 72 hours   Lab Units 07/22/24  2209   ALK PHOS U/L 96   BILIRUBIN TOTAL mg/dL 1.2   PROTEIN TOTAL g/dL 7.8   ALT U/L 6*   AST U/L 13     Results from last 72 hours   Lab Units 07/22/24  2209   APTT seconds 27   INR  1.0       Results from last 72 hours   Lab Units 07/22/24  2209   TROPHSCMC ng/L 35      Results from last 72 hours   Lab Units 07/22/24  2136   POCT GLUCOSE mg/dL 93      Results from last 72 hours   Lab Units 07/22/24  2210   POCT PH, VENOUS pH 7.42   POCT PCO2, VENOUS mm Hg 42   POCT PO2, VENOUS mm Hg 39   POCT  "SO2, VENOUS % 64   POCT HEMATOCRIT CALCULATED, VENOUS % 41.0   POCT SODIUM, VENOUS mmol/L 139   POCT POTASSIUM, VENOUS mmol/L 3.7   POCT CHLORIDE, VENOUS mmol/L 104   POCT IONIZED CALCIUM, VENOUS mmol/L 1.16   POCT GLUCOSE, VENOUS mg/dL 94   POCT LACTATE, VENOUS mmol/L 1.4   POCT BASE EXCESS, VENOUS mmol/L 2.4   POCT HCO3 CALCULATED, VENOUS mmol/L 27.2*   POCT HEMOGLOBIN, VENOUS g/dL 13.8   POCT ANION GAP, VENOUS mmol/L 12.0        Results from last 72 hours   Lab Units 07/22/24  2209   SED RATE mm/h 11            No lab exists for component: \"MOTOAB\"     Urine                  Microbiology                Imaging  XR chest 1 view   Final Result   No radiographic evidence of acute cardiopulmonary pathology.        MACRO:   None.        Signed by: Evan Finkelstein 7/23/2024 12:42 AM   Dictation workstation:   [id number]      CT brain attack angio head and neck W and WO IV contrast   Final Result   Dominant right vertebral artery. Diminutive caliber of the left   vertebral artery is most pronounced at the V4 segment with return to   normal caliber distally. Otherwise no significant stenosis or   occlusion throughout the neck vasculature. No occlusion or severe   stenosis throughout the intracranial vessels        I personally reviewed the images/study and I agree with the findings   as stated by Kvng Birch MD. This study was interpreted at   [facility], [place], [place].        The above findings were communicated to Dr. Tamir Ball at 10:30 pm   via epic chat.        MACRO:   None.        Signed by: Evan Finkelstein 7/22/2024 10:44 PM   Dictation workstation:   [id number]      CT brain attack head wo IV contrast   Final Result   No acute intracranial hemorrhage, mass effect or midline shift.   Similar-appearing old infarcts and scattered white matter   hypodensities which may represent sequela of small vessel ischemia.   Consider further evaluation with MRI if there is persistent " clinical   concern for acute ischemia.        I personally reviewed the images/study and I agree with the findings   as stated by Kvng Birch MD. This study was interpreted at   [facility], [place], [place].        MACRO:   None.        Signed by: Evan Finkelstein 7/22/2024 10:38 PM   Dictation workstation:   [id number]      Point of Care Ultrasound    (Results Pending)   Transthoracic Echo (TTE) Complete    (Results Pending)   MR brain wo IV contrast    (Results Pending)   MR angio neck wo IV contrast    (Results Pending)   MR angio head wo IV contrast    (Results Pending)       Diagnostic studies and ED interventions germane to this period of clinical observation will include: cardiac telemetry, additional labs, pulse oximetry, swallow evaluation, MRA/MRI and TTE in AM,         Consultants (if applicable)  1) Ophthalmology  Assessment:  54 y.o. male with hx of L PCA infarct (2016 - chronic right sided sensory loss, homonymous hemianopsia, hemiplegia), Factor V Leiden (on xareleto) HTN HLD presenting with sudden transient vision loss in both eyes and subjective visual field changes. Exam is notable for excellent central visual acuity (VA) with right homonymous hemianopsia, documented previously by neurology with excellent color vision. Imaging notable for significant encephalomalacia from old stroke affecting the left parietal and occipital lobes. Consideration was give to GCA however with location of headache, normal ESR/platelets, and chronicity of vision changes with return in vision would not be typical. Eyes with bilaterally optic nerve cupping however otherwise structurally healthy. Transient vision loss in both eyes is most consistent with a neurovascular etiology.      #Transient vision loss, both eyes  #Subjective visual field loss, both eyes  - Vision loss complete blackness ~ 30 minutes duration  - Patient with multiple vascular RF including hx of prior stroke, Factor  "V Leiden (pt unsure if he is taking properly), HTN, HLD  - CT and CTA head/neck completed without evidence of significant new stenosis or occlusion   - Neurology recommendations  - Presentation concerning for neurovascular etiology as would be unusual for bilateral ICA or ophthalmic artery occlusions. Would work up as amaurosis. Current vision I suspect is at baseline for patient with baseline visual field defects, although has not had formal testing.   - Labs: CBC, lipids, glucose, A1c  - Recommend close outpatient follow up with PCP/neurology for management of vascular risk factors for stroke and medication management     #Optic nerve cupping, both eyes  - Normal intraocular pressure (IOP) with deep AC   - Ddx glaucomatous changes, thinning related to CVA  - Recommend outpatient formal visual field (VF), routine glaucoma evaluation           Recommend follow-up with  [facility], within 2-3. Please call [phone] (EYES) to make appointment.    2) Neurology  Assessment:  Michael Reed is a 54 y.o. Right-handed male with PMH of a L PCA infarct (w/ chronic R HH, R sided hemiplegia), Factor V Leiden (on xarelto), HTN, and HLD who presented to [facility] on 7/22/2024 with worsening vision, neurology consulted for concern as a BAT for a potential new stroke. NIHSS was 8 for chronic findings. On history and reexamination, the patient demonstrated conflicting findings; however, he is now demonstrating R eye monocular LUQ vision loss and reporting a subjective, vague sense of whole vision \"patchiness.\" His CTH/CTA were negative for any acute findings. He does have an associated headache. This would not be typical for an infarct, but there a CRAO could be considered (would not explain patient's ability to still fixate, blink to threat, and initially finger count normally). The differential would also include GCA given headache, age of presentation.      Type: Unknown  Subtype: Undetermined etiology  Vessels Involved:  " Working diagnosis is less likely a stroke  Neurologic Manifestations: NIHSS 8 (Chronic)  Deficits: NIHSS 8  Initial treatment: None  Anti-platelets or Anti-coagulation management: Aspirin  Vascular Risk Factors: HTN and HLD  BP goal: Normotension  Disposition:  Pending work-up       Recommendations:  Recommend ESR/CRP  Recommend consideration of ophthalmology consult given monocular findings  No MRI indicated at this time  ASA 81mg daily  Atorvastatin 40mg  sBP goal Normotension     Vascular Risk Factor modification goals:  Blood pressure goals: avoid hypotension SBP <100 that could worsen cerebral perfusion,  Normotension  Lipid Goals: education on healthy diet and statin therapy to maintain or achieve goal LDL-cholesterol < 70mg  Glucose Goals: early treatment of hyperglycemia to goal glucose 140-180 mg/dl with long-term goal A1c < 7%   Smoking Cessation and Education  Assessment for Rehabilitation needs including PT/OT and if indicated swallow evaluation and speech therapy   Patient and family education on signs and symptoms of stroke, calling 911, risk factors, and healthy strategies for stroke prevention.       Andrzej Longo MD  PGY-4 Neurology  Stroke [phone]      Assessment and Plan   In summary, Michael Reed is admitted to the [facility] Clinical Decision Unit for TIA/Amaurosis Fugax. Dr. Lopez is the CDU admission attending.    This patient has been risk-stratified based on available history, physical exam, and related study findings. Admission to the observation status for further diagnosis/treatment/monitoring of patient's condition is warranted clinically. This extended period of observation is specifically required to determine the need for hospitalization.     The goals of this admission based on the patient’s clinical problem list are:  1) Amaurosis Fugax      Suspect TIA     -- Cardiac telemetry     -- Bedside swallow assessment     -- Serial neuro checks + VS Q4 Hrs      -- NPO @ midnight     -- TTE w/ bubble study in AM     -- MRI brain w/o contrast (stroke protocol) + MRA Head & Neck ordered     -- Labs: HbA1c, lipids, BNP, hepatic panel     -- DVT prophylaxis: SCD, home Xarelto     -- Continue 40mg Statin and baby ASA everyday     -- Neurology consulted for additional recommendations   > Resume home Xarelto dose  > Start 40mg Atorvastatin at bedtime  > Neuro to follow-up in AM on MRI/MRA results      -- Continue home BP medication    We will observe the patient for the following endpoints:   Stable vital signs  No new neurological deficits, AMS or recurrence of S/S  Negative echocardiogram  No significant (>50%) stenosis on vascular imaging or abnormal MRI findings.  Ability to tolerate PO intake.    When met, appropriate disposition will be arranged.      Diamante Jeffrey PA-C  [facility]  Emergency Dept.

## 2024-07-23 NOTE — PROGRESS NOTES
"Physical Therapy                 Therapy Communication Note    Patient Name: Michael Reed  MRN: [id number]  Today's Date: 7/23/2024     Discipline: Physical Therapy    Missed Visit Reason: Other (Comment) (Per team, pt no longer needs to be seen. And they will take the order out. Secure chat \"Actually no longer needs to be seen! we will take order off\" - Dr Mo)    Missed Time: 0940 Attempt      "

## 2024-07-23 NOTE — H&P
"    Subjective     HPI  Michael Reed is a 54 y.o. Right-handed male with PMH of a L PCA infarct (w/ chronic R HH, R sided hemiplegia), Factor V Leiden (on xarelto), HTN, and HLD who presented to [facility] on 7/22/2024 with worsening vision, neurology consulted for concern as a BAT for a potential new stroke. The patient was LKW at approximately 2015 today when he was sitting on the porch. He is a relatively poor historian and is unable to clearly articulate his symptoms, with different conflicting statements. However, on further questioning he does seem to say that he had the sudden onset of \"total blackness\" in BOTH of his eyes that lasted for several minutes. He has had a R sided temporally-localized headache that started at this same time. His vision overall returned, but following this he says his vision is \"patchy\" and that he can \"only see the left side\" of the provider; when asked if this is how is vision normally is, he does state that this has been present since 2017. When asked to elaborate further what his new deficit is, he continues to just state that his vision is \"patchy,\" but is unable to describe beyond this. He says that he cannot see entirely out of his R eye at times, then at other times denies this. While being examined initially for the BAT, he actually had INTACT L visual field in both eyes, then later demonstrated loss of vision in only his L upper quadrant of his R eye, then later complete loss of vision in his R eye to finger counting, yet blinks to threat, fixates appropriately (see exam below). On interview, he does confirm that he has minimal strength in his RUE and moderate weakness in his RLE with sensory loss at baseline. He denies jaw claudication, fevers, episodes of prior severe joint pains.    NIHSS was 8 (2 Visual, 3 RUE, 3 RLE), BG was 93, BP was 185/115. CTH and CTA were done and personally reviewed, which are notable for L PCA territory encephalomalacia, no new hypodensities or " ICH compared to prior imaging, and no LVO.     Past Medical History  Past Medical History:   Diagnosis Date    Cardiomegaly 03/11/2015    Cardiomegaly    Essential (primary) hypertension 06/18/2015    Hypertension    Other pulmonary embolism without acute cor pulmonale (Multi) 06/18/2015    Pulmonary embolism     Surgical History  Past Surgical History:   Procedure Laterality Date    CT ABDOMEN PELVIS ANGIOGRAM W AND/OR WO IV CONTRAST  12/6/2016    CT ABDOMEN PELVIS ANGIOGRAM W AND/OR WO IV CONTRAST 12/6/2016 [facility]    MR HEAD ANGIO WO IV CONTRAST  4/30/2017    MR HEAD ANGIO WO IV CONTRAST 4/30/2017 [facility] CLINICAL LEGAC[facility]    MR HEAD ANGIO WO IV CONTRAST  12/6/2016    MR HEAD ANGIO WO IV CONTRAST 12/6/2016 [facility]GAC[facility]    MR NECK ANGIO WO IV CONTRAST  4/30/2017    MR NECK ANGIO WO IV CONTRAST 4/30/2017 [facility] CLINICAL LEGAC[facility]    MR NECK ANGIO WO IV CONTRAST  12/6/2016    MR NECK ANGIO WO IV CONTRAST 12/6/2016 [facility] CL[facility]GAC[facility]     Social History     Allergies  Patient has no allergy information on record.    Home Medications  No current outpatient medications        Objective   24h Vitals  Heart Rate:  [88-94]   Respirations:  [19-20]   BP: (185-195)/(115-140)   Pulse Ox:  [92 %-97 %]      Physical Exam  NEUROLOGIC EXAM:    MENTAL STATUS:  - Alert and oriented to person, place, and time  - Recall of recent events intact.  - Speech is fluent. Follows complex commands. Able to identify 3 objects.    CRANIAL NERVES:  - CN I: Not Assessed  - CN II: R HH present, *Conflicting exam findings for R Eye: 1) Initially intact to finger counting in LUQ and LLQ, 2) Later demonstrated LUQ R eye vision loss only, 3) Then Inability to finger count in all quadrants only in R eye  - CN III, IV, VI: Extraocular movements intact without nystagmus  - CN V: Decreased R hemifacial sensation to light touch  - CN VII: No facial droop, closes eyes against resistance  - CN VIII: Hearing intact to voice  - CN IX, X: Palate  "elevates symmetrically  - CN XII: Tongue midline without atrophy or fasciculations    MOTOR:  - No tremors or abnormal movements  - Normal bulk and tone throughout  - Strength: RUE: 2/5, RLE 3/5, LUE 5/5, LLE 5/5    REFLEXES:   L                R  Biceps            +2               +2  Brachioradialis+2               +2  Patellar            +2               +2  Achilles +2               +2    COORDINATION: Finger to nose intact in LUE, unable to assess due to hemiplegia in RUE  SENSATION: Decreased sensation to light touch in LUE and LLE  GAIT: Can stand with 1 person assist    NIHSS  Level of consciousness: 0 = Alert  LOC Question: 0 = Both correct  LOC Commands: 0 = Obeys both  Best Gaze: 0 = Normal  Visual Field: 2 = Complete HH  Facial Paresis: 0 = Normal  LUE: 0 = No drift; 10 sec  RUE: 3 = No effort vs gravity  LLE: 0 = No drift; 5 sec  RLE: 3 = No effort vs gravity  Dysarthria: 0 = Normal  Best Language: 0 = No aphasia  Limb Ataxia: 0 = Absent  Sensory: 0 = Absent  Neglect: 0 = None  NIHSS Score: 8     Recent Labs        No lab exists for component: \"PHOSPHORUS\"              No results found for: \"HGBA1C\", \"LDLF\"     Imaging  CT head results: No CT head results found for the past 12 months  Brain vessel imaging results: No brain vessel imaging results found for the past 12 months        IV Thrombolysis IV Thrombolysis Checklist      IV Thrombolysis Given: No; Thrombolysis contraindication reason: Symptoms resolved    Assessment/Plan   Michael Reed is a 54 y.o. Right-handed male with PMH of a L PCA infarct (w/ chronic R HH, R sided hemiplegia), Factor V Leiden (on xarelto), HTN, and HLD who presented to [facility] on 7/22/2024 with worsening vision, neurology consulted for concern as a BAT for a potential new stroke. NIHSS was 8 for chronic findings. On history and reexamination, the patient demonstrated conflicting findings; however, he is now demonstrating R eye monocular LUQ vision loss and reporting a " "subjective, vague sense of whole vision \"patchiness.\" His CTH/CTA were negative for any acute findings. He does have an associated headache. This would not be typical for an infarct, but there a CRAO could be considered (would not explain patient's ability to still fixate, blink to threat, and initially finger count normally). The differential would also include GCA given headache, age of presentation.     Patient obtained MRI/MRA, which actually shows a small new R PCA infarct. This is possibly cardioembolic in the setting of xarelto non-adherence (patient is a poor historian and cannot state clearly how often he misses).     Type: Unknown  Subtype: Undetermined etiology  Vessels Involved: Working diagnosis is less likely a stroke  Neurologic Manifestations: NIHSS 8 (Chronic)  Deficits: NIHSS 8  Initial treatment: None  Anti-platelets or Anti-coagulation management: Aspirin  Vascular Risk Factors: HTN and HLD  BP goal: Normotension  Disposition: Pending work-up     Plan:  Resume home xarelto, 20 mg when able  TTE w/ bubble study  Telemetry while inpatient, ziopatch at discharge  ASA 81mg daily  Atorvastatin 40mg  sBP goal < 220  PT/OT/SLP    Vascular Risk Factor modification goals:  Blood pressure goals: avoid hypotension SBP <100 that could worsen cerebral perfusion, Normotension  Lipid Goals: education on healthy diet and statin therapy to maintain or achieve goal LDL-cholesterol < 70mg  Glucose Goals: early treatment of hyperglycemia to goal glucose 140-180 mg/dl with long-term goal A1c < 7%   Smoking Cessation and Education  Assessment for Rehabilitation needs including PT/OT and if indicated swallow evaluation and speech therapy   Patient and family education on signs and symptoms of stroke, calling 911, risk factors, and healthy strategies for stroke prevention.      #HLD: Atorva 40  #HTN: Hold home amlodipine 10 mg, lisinopril 40 mg, carvedilol 3.125 mg BID  Depression: Home paroxetine 20 mg    Andrzej PERAZA" MD [place]  PGY-4 Neurology  Stroke [id number]    Recommendations are not final until formally staffed with the attending physician, Dr. Gudino.

## 2024-07-23 NOTE — ED TRIAGE NOTES
Pt to ED with c/o vision changed around 2015 this evening and right sided HA. Pt describes being able to see the bottom half of his vision, but no vision on the top half.     Hx of CVA 6 years ago- right sided weakness.    BGL 93 in triage. Dr. Spears to Triage to assess patient. BAT called. Pt to CT then Rm. 3.

## 2024-07-23 NOTE — CONSULTS
Reason for consult: Transient vision loss    History Of Present Illness  Michael Reed is a 54 y.o. male with hx of L PCA infarct (chronic right sided sensory loss, homonymous hemianopsia, hemiplegia), Factor V Leiden (on xareleto) HTN HLD presenting with sudden vision loss in both eyes. Difficult historian. This morning patient was on the porch and all of a sudden his vision in both eyes suddenly went black. At this time he also had a headache of the right forehead. This vision loss lasted for 30 minutes and some vision has since returned. However, he is only seeing pieces of his surroundings in both eyes. He believes he cannot see the top and right halves of his vision well. The headache has improved but is still lingering in the region of the right forehead. Left eye better than right. Denies any eye pain, diplopia, flashes, floaters, or jaw claudication. Unable to tell if he has scalp tenderness due to chronic right sensory loss from prior stroke. Code BAT was called upon arrival and neurology evaluated for a stroke. Neurology workup so far without concern for acute stroke. Patient claims that he has had right eye deficits after his prior stroke 6 years ago. He is unsure if this was his right eye or right half of his vision. He was asked multiple times if this right sided vision loss is new and patient would not provide a direct answer. He does believe the top of his vision is newly missing.      Past Medical History  He has a past medical history of Cardiomegaly (03/11/2015), Essential (primary) hypertension (06/18/2015), and Other pulmonary embolism without acute cor pulmonale (Multi) (06/18/2015).    Family History: reviewed and not pertinent to chief complaint  Medications: please refer to medication reconciliation  Allergies: please refer to patient allergy list    Physical Exam  Base Eye Exam       Visual Acuity (Snellen - Linear)         Right Left    Near sc 20/40-1 PH 20/20-1 20/25 PH 20/20-1   Eccentric  viewing             Tonometry (Tonopen, 10:52 PM)         Right Left    Pressure 15 14              Pupils         Dark Light Shape React APD    Right 3 2 Round Brisk None    Left 3 2 Round Brisk None              Visual Fields (Counting fingers)         Left Right                                Dilation       Both eyes: 1% Mydriacyl & 2.5% Julian  @ 10:54 PM                  Additional Tests       Color         Right Left    Ishihara 11/11 11/11                    Vitals:  /115    Imaging  CT Head without contrast:   Personally reviewed. No acute hemorrhage. Encephalomalacia of the left parietal and occipital lobe, hippocampus, and thalamus compatible with old stroke. Old punctate hypodensities in carolin and cerebellum.    CT Angio Head and Neck:  IMPRESSION:  Dominant right vertebral artery. Diminutive caliber of the left  vertebral artery is most pronounced at the V4 segment with return to  normal caliber distally. Otherwise no significant stenosis or  occlusion throughout the neck vasculature. No occlusion or severe  stenosis throughout the intracranial vessels    Labs:  ESR 11   Platelets 249    Assessment:  54 y.o. male with hx of L PCA infarct (2016 - chronic right sided sensory loss, homonymous hemianopsia, hemiplegia), Factor V Leiden (on xareleto) HTN HLD presenting with sudden transient vision loss in both eyes and subjective visual field changes. Exam is notable for excellent central visual acuity (VA) with right homonymous hemianopsia, documented previously by neurology with excellent color vision. Imaging notable for significant encephalomalacia from old stroke affecting the left parietal and occipital lobes. Consideration was give to GCA however with location of headache, normal ESR/platelets, and chronicity of vision changes with return in vision would not be typical. Eyes with bilaterally optic nerve cupping however otherwise structurally healthy. Transient vision loss in both eyes is most  consistent with a neurovascular etiology.     #Transient vision loss, both eyes  #Subjective visual field loss, both eyes  - Vision loss complete blackness ~ 30 minutes duration  - Patient with multiple vascular RF including hx of prior stroke, Factor V Leiden (pt unsure if he is taking properly), HTN, HLD  - CT and CTA head/neck completed without evidence of significant new stenosis or occlusion   - Neurology recommendations  - Presentation concerning for neurovascular etiology as would be unusual for bilateral ICA or ophthalmic artery occlusions. Would work up as amaurosis. Current vision I suspect is at baseline for patient with baseline visual field defects, although has not had formal testing.   - Labs: CBC, lipids, glucose, A1c  - Recommend close outpatient follow up with PCP/neurology for management of vascular risk factors for stroke and medication management    #Optic nerve cupping, both eyes  - Normal intraocular pressure (IOP) with deep AC   - Ddx glaucomatous changes, thinning related to CVA  - Recommend outpatient formal visual field (VF), routine glaucoma evaluation    Addendum:  MRI/MRA obtained revealing small new R posterior cerebral artery (PCA) infarct. Neuro stroke is currently evaluating. Expect this is cardioembolic in setting of xarelto non-adherence.   - Recommend complete Neurology stroke workup and management of vascular risk factors       Recommend follow-up with  [facility], within 2-3. Please call [phone] ([phone] to make appointment.    Thank you for the consult. Please contact the Ophthalmology service with further questions or concerns.    Wilber Todd MD  Department of Ophthalmology, PGY-3    Seen and examined with Dr. Tobar PGY2    Ophthalmology Adult Pager - [phone]  Ophthalmology Pediatrics Pager - [phone]     For adult follow-up appointments, call: [phone]  For pediatric follow-up appointments, call: [phone]

## 2024-07-23 NOTE — PROGRESS NOTES
Speech-Language Pathology                 Therapy Communication Note    Patient Name: Michael Reed  MRN: [id number]  Today's Date: 7/23/2024     Discipline: Speech Language Pathology    Missed Visit Reason:  Patient's speech/language/swallow is at baseline.  Patient with previous strokes, residual R weakness.  Per RN, passed bedside swallow screen, consumed medications, and ate breakfast tray without difficulty.  Speech is clear, no aphasia.  Will discharge SLP orders at this time.  Please re-consult if there is a change in patient status.     Missed Time: Cancel

## 2024-07-23 NOTE — ED PROCEDURE NOTE
Procedure  Critical Care    Performed by: Chente Silva DO  Authorized by: Chente Silva DO    Critical care provider statement:     Critical care time (minutes):  31    Critical care time was exclusive of:  Separately billable procedures and treating other patients and teaching time    Critical care was necessary to treat or prevent imminent or life-threatening deterioration of the following conditions:  CNS failure or compromise    Critical care was time spent personally by me on the following activities:  Blood draw for specimens, development of treatment plan with patient or surrogate, evaluation of patient's response to treatment, examination of patient, ordering and review of laboratory studies, ordering and performing treatments and interventions, ordering and review of radiographic studies, re-evaluation of patient's condition, review of old charts and discussions with consultants               Chente Silva DO  07/23/24 [id number]

## 2024-07-23 NOTE — HOSPITAL COURSE
Michael Reed is a 54 y.o. Right-handed male with PMH of a L PCA infarct (w/ chronic R HH, R sided hemiplegia), Factor V Leiden (on Xarelto, non-adherent), HTN, and HLD who presented to [facility] on 7/22/2024 with worsening vision, neurology consulted for concern as a BAT for a potential new stroke. NIHSS was 8 for chronic findings.     His CTH/CTA were negative for any acute findings. He does have an associated headache. This would not be typical for an infarct, but CRAO could be considered (would not explain patient's ability to still fixate, blink to threat, and initially finger count normally). Ddx includes GCA given headache, age of presentation.      MRI/MRA shows a small new R PCA infarct suspected to be cardioembolic in the setting of Xarelto non-adherence. Pt reports that he takes it for a month or two and then stops due to stubbornness.     TTE with bubble study stable with estimated LVEF 30-35%.     Given new symptoms were vision-related, no PT eval was pursued. SLP found that pt's swallow and speech are at baseline.     Follow up:  Follow up with PCP within 1-2 wks of discharge  Follow up with stroke neurology - Dr. Kassie Loyd ~6 wks after discharge  Follow up with cardiology for severely reduced LVEF  Resume home Xarelto 20 mg, strongly encouraged adherence  TTE w/ bubble study stable compared to last echo in 2016 to be discussed outpatient   Further stroke work-up to be discussed outpatient   ASA 81mg daily  Atorvastatin 40mg

## 2024-07-23 NOTE — DISCHARGE INSTRUCTIONS
"Dear Mr. Reed,   It was a pleasure caring for you! You were admitted to [facility] ([facility] on July 23, 2024 for worsening vision due to a new stroke we can see on the MRI picture of your head.     You let us know that you do not take your blood-thinning medicine called Xarelto every day and we believe this is the reason you had a new stroke. That medicine and your other medicines are very important in protecting you from having another blood clot in your brain or somewhere else in your body. We encourage you to work on taking it everyday. You can do it!     The picture of your heart called an echocardiogram or \"echo\" showed that your heart is not pumping as well as it normally should. We recommend that you see a heart doctor called a cardiologist to talk more about this. If you already have a heart doctor you like, please see them. If not, we have placed referral orders so you will be called by the  scheduling team to make an appointment with a new heart doctor.     Please come to the emergency department any new neurologic symptoms, including new numbness, tingling, weakness, abnormal sensations, visual loss, or change in bowel or bladder control.    For you to do:  - Because your vision is impaired, it is NOT safe to drive until a doctor tells you it is okay.   - Take all of your medications as prescribed. This is very important to protect you from another stroke or other blood clots.  - Take Xarelto 20mg every day.    - Follow-up with your primary care doctor within 1-2 weeks after leaving the hospital*  - Follow-up with stroke neurology - Dr. Kassie Loyd about 6 weeks after leaving the hospital*  - Follow-up with a heart doctor (cardiology)*    *The referrals we have ordered for you mean that the scheduling team should call you to make the appointments. If, for some reason, you do not get a call from them within 3-5 days, please call this number: [phone]. "     Thank you for letting us take part in your care.  [facility] Inpatient stroke service

## 2024-07-23 NOTE — ED PROVIDER NOTES
"HPI   Chief Complaint   Patient presents with    Loss of Vision       Patient is a 54-year-old male with past medical history of factor V Leiden (on rivaroxaban) prior PCA stroke with residual right hemiplegia, hemisensory loss, hemianopia presenting as a BAT activation for concern for new vision loss.  Patient endorses sudden onset of \"spotty\" vision at approximately 8:30 PM today.  Endorses residual visual field cuts but endorses new vision loss is spottiness.  Does endorse brief episode of complete loss of vision in both eyes that he describes as blacking out.  Describes the symptoms as when he looks in the face he only sees some component such as lips and when I when he looks down he only sees pieces of someone's shoe.  Does endorse some chronic difficulty with chewing and pain with mastication as well as a recent headache that started at vision symptom onset.            Patient History   Past Medical History:   Diagnosis Date    Cardiomegaly 03/11/2015    Cardiomegaly    Essential (primary) hypertension 06/18/2015    Hypertension    Other pulmonary embolism without acute cor pulmonale (Multi) 06/18/2015    Pulmonary embolism     Past Surgical History:   Procedure Laterality Date    CT ABDOMEN PELVIS ANGIOGRAM W AND/OR WO IV CONTRAST  12/6/2016    CT ABDOMEN PELVIS ANGIOGRAM W AND/OR WO IV CONTRAST 12/6/2016 [facility]GAC[facility]    MR HEAD ANGIO WO IV CONTRAST  4/30/2017    MR HEAD ANGIO WO IV CONTRAST 4/30/2017 [facility] CLINICAL [facility]GAC[facility]    MR HEAD ANGIO WO IV CONTRAST  12/6/2016    MR HEAD ANGIO WO IV CONTRAST 12/6/2016 [facility]ICAL [facility]GAC[facility]    MR NECK ANGIO WO IV CONTRAST  4/30/2017    MR NECK ANGIO WO IV CONTRAST 4/30/2017 [facility] CLINICAL LEGACY    MR NECK ANGIO WO IV CONTRAST  12/6/2016    MR NECK ANGIO WO IV CONTRAST 12/6/2016 [facility]ICAL [facility]GAC[facility]     No family history on file.  Social History     Tobacco Use    Smoking status: Not on file    Smokeless tobacco: Not on file   Substance Use Topics    Alcohol use: Not " on file    Drug use: Not on file       Physical Exam   ED Triage Vitals   Temp Heart Rate Respirations BP   -- 07/22/24 2145 07/22/24 2145 07/22/24 2145    88 20 (!) 185/115      Pulse Ox Temp src Heart Rate Source Patient Position   07/22/24 2145 -- 07/22/24 2206 07/22/24 2145   (!) 92 %  Monitor Sitting      BP Location FiO2 (%)     07/22/24 2145 07/22/24 2145     Left arm 21 %       Physical Exam  Constitutional:       Appearance: Normal appearance.   HENT:      Head: Normocephalic and atraumatic.   Eyes:      Extraocular Movements: Extraocular movements intact.   Cardiovascular:      Rate and Rhythm: Normal rate and regular rhythm.   Pulmonary:      Effort: Pulmonary effort is normal.      Breath sounds: Normal breath sounds.   Musculoskeletal:         General: Normal range of motion.      Cervical back: Normal range of motion.   Skin:     General: Skin is warm and dry.   Neurological:      General: No focal deficit present.      Mental Status: He is alert and oriented to person, place, and time.      Comments: Right hemianopia consistently testable (difficulty with flash fingers in either right visual field).  Inconsistent left visual field cuts.  Most consistent visual field cut is left upper field of right eye but intermittently having other deficits in left visual fields on repeated exams as well. 2/5 strength diffusely in right upper extremity.  3/5 strength diffusely in right lower extremity.  Diminished sensation to light touch in right upper extremity and right lower extremity.  Stands with assistance.  NIHSS of 8 but driven by known prior deficits.   Psychiatric:         Mood and Affect: Mood normal.         Behavior: Behavior normal.           ED Course & MDM   ED Course as of 07/23/24 1100   Mon Jul 22, 2024 2218 CT brain attack head wo IV contrast  Independently reviewed CT head scan.  Negative for obvious intracranial bleed.  Prior stroke visualized. [WJ]   2310 Sed Rate: 11  Lower concern for GCA  [WJ]      ED Course User Index  [WJ] Chente Silva DO                       Carnegie Coma Scale Score: 15         NIH Stroke Scale: 8                Medical Decision Making  EKG shows a normal rate of 86 bpm, normal sinus rhythm, normal axis,  ms,  ms, QTc 449 ms.  ST depressions in leads II, 3, aVF approximately 1 mm new from prior EKG, otherwise normal ST and T wave pattern with no evidence of acute ischemia or other acute findings.    Patient is a 54-year-old male with past medical history of factor V Leiden (on rivaroxaban) prior PCA stroke with residual right hemiplegia, hemisensory loss, hemianopia presenting as a BAT activation for concern for new vision loss.  Patient otherwise with residual right-sided deficits consistent with prior stroke but reportedly no changes from baseline.  Ophthalmology exam inconsistent but most reproducible deficit is left upper visual field deficit isolated in the right eye.  Reduced concern for central cause with monocular vision loss and ophthalmology consulted for additional recommendations.  No clear primary ophthalmologic pathology with continued concern for neurovascular cause.  MRI/MRA obtained which showed evidence of new right PCA stroke in addition to old left PCA stroke.  Patiently initially admitted to CDU for completion of TIA/amaurosis fugax workup but eventually escalated to full neurology admission after MRI evidence of new stroke.    Patient seen and discussed with Dr. Ricardo Ball MD, PhD  Emergency Medicine PGY3          Procedure  Procedures     Tamir Ball MD  Resident  07/23/24 3668

## 2024-07-24 LAB
ATRIAL RATE: 86 BPM
P AXIS: 55 DEGREES
P OFFSET: 204 MS
P ONSET: 139 MS
PR INTERVAL: 156 MS
Q ONSET: 217 MS
QRS COUNT: 14 BEATS
QRS DURATION: 102 MS
QT INTERVAL: 376 MS
QTC CALCULATION(BAZETT): 449 MS
QTC FREDERICIA: 424 MS
R AXIS: 56 DEGREES
T AXIS: -30 DEGREES
T OFFSET: 405 MS
VENTRICULAR RATE: 86 BPM

## 2024-07-24 NOTE — DISCHARGE SUMMARY
Discharge Diagnosis  Ischemic stroke    Problem List  Principal Problem:    TIA (transient ischemic attack)  Active Problems:    Amaurosis fugax    Arterial ischemic stroke (Multi)      Michael Reed is a 54 y.o. male w h/o L PCA stroke and R hemiplegia, Factor V Leiden on AC, HTN, HLD, presented with bilateral vision loss lasting a few mins in the setting of poor adherence to Xarelto after prior stroke. They were diagnosed with a stroke.  Etiology: Ischemic Stroke: Cardioembolism due to lack of adherence to Xarelto    Relevant hospital complications: N/A  Discharge antithrombotics: Home Xarelto, encouraged adherence  Pending evaluation:  TTE   Issues Requiring Follow-Up:   - PCP follow-up   - Neurology stroke follow-up    - Cardiology follow-up for CHF, LVEF 30-35%  - Xarelto adherence  MR brain wo IV contrast    Result Date: 7/23/2024  Interpreted By:  Yefri Thomas, STUDY: MR BRAIN WO IV CONTRAST; MR ANGIO NECK WO IV CONTRAST; MR ANGIO HEAD WO IV CONTRAST;  7/23/2024 5:18 am   INDICATION: Signs/Symptoms:TIA work-up due to transient monocular vision loss. Stroke protocol.   COMPARISON: CT head w/o contrast dated 07/22/2024, CT angiography head/neck 07/22/2024   ACCESSION NUMBER(S): [id number]; [id number]; [id number]   ORDERING CLINICIAN: EVANGELIST CHEN   TECHNIQUE: Multiplanar, multi-sequence images of the brain were obtained without contrast.   3D time-of-flight MR angiography of the head and neck was performed. The images were reviewed as source images and maximum intensity projections.   FINDINGS: MRI BRAIN:   Diffusion weighted images show an acute cortical infarct involving the medial-most aspect of the right occipital lobe within PCA territory.   There is redemonstration of cystic encephalomalacia throughout the medial left occipital lobe, dorsal medial left thalamus, and left parahippocampal gyrus reflecting chronic left PCA territory infarct. This is on a background moderate-burden of bilateral  patchy periventricular and subcortical hemispheric T2/FLAIR white matter hyperintensities are most compatible with chronic small vessel ischemic disease. There is a redemonstration of chronic infarct in the left cerebellar hemisphere. Moderate chronic small vessel ischemic changes in the rostral carolin in a linear distribution likely represent pontine  infarct.   There is no acute intraparenchymal hemorrhage, mass, mass-effect, or an extra-axial fluid collection.   The ventricular size and cerebral volume are age-concordant.   Normal morphology of midline structures. The craniovertebral junction is normal.   The orbits and globes are unremarkable.   The paranasal sinuses show no air-fluid levels or hemorrhage. Small mucous retention cyst in the right maxillary sinus.   The mastoid air cells are clear.     MRA HEAD: There is redemonstration of a short segment moderate stenosis of the left intracranial vertebral artery, which is progressed in degree of narrowing compared to 05/21/2016. There is mild waist like narrowing of the mid to distal basilar artery that may have reflected prior thrombosis and recanalization given the pontine periphery infarcts. The PICA, AICA, and SCA are patent bilaterally. The left PCA is chronically occluded with only a short segment of irregularly diminutive P1 segment remaining. The There is no hemodynamically significant intracranial stenosis or large vessel occlusion. There is no intracranial aneurysm.     MRA NECK: Source images are motion degraded. The origins of the carotid and vertebral arteries are not included. Mild spin-dephasing artifact is noted at the carotid bulbs.   COMMON/INTERNAL CAROTID ARTERIES:  No occlusion, hemodynamically significant stenosis, or dissection.   VERTEBRAL ARTERIES:  No occlusion, hemodynamically significant stenosis, or dissection.       MRI BRAIN: 1. Acute cortical infarct in the medial-most right occipital lobe involving PCA territory. 2.  Chronic left PCA territory infarct involving the occipital lobe, parahippocampal gyrus, and dorsal medial left thalamus. 3. Moderate burden of supratentorial and infratentorial chronic small vessel ischemic changes including left cerebellar infarct, central pontine  infarct, supratentorial white matter disease.   MRA HEAD AND NECK: 1. No evidence of major vessel intracranial or extracranial occlusion. 2. Moderate narrowing of the intracranial vertebral artery which is slightly progressed from 05/21/2016. 3. Chronically occluded left PCA in keeping with remote left PCA territory infarct.   MACRO: Denita Cox discussed the significance and urgency of this critical finding by telephone with  Dr Lopez on 7/23/2024 at 5:05 a.m.. (**-RCF-**) Findings:  See findings.   Signed by: Yefri Thomas 7/23/2024 8:29 AM Dictation workstation:   [id number]   No CT head results found for the past 14 days  Transthoracic Echo (TTE) Complete    Result Date: 7/23/2024   [facility], [address] [place] [address]                Tel [phone] and Fax [phone] TRANSTHORACIC ECHOCARDIOGRAM REPORT  Patient Name:      SANTO Thompson Physician:    [id number] Rickie Duran MD Study Date:        7/23/2024            Ordering Provider:    [id number] EVANGELIST CHEN MRN/PID:           [id number]             Fellow: Accession#:        [id number]         Nurse:                Leslie Vargas RN Date of Birth/Age: 1970 / 54 years  Sonographer:          Marysol Rodriguez RDCS Gender:            M                    Additional Staff: Height:            185.42 cm            Admit Date:           7/22/2024 Weight:            113.40 kg            Admission  Status:     Inpatient -                                                               Routine BSA / BMI:         2.37 m2 / 32.98      Encounter#:           [id number]                    kg/m2 Blood Pressure:    185/115 mmHg         Department Location:  [facility] Non                                                               Invasive Study Type:    TRANSTHORACIC ECHO (TTE) COMPLETE Diagnosis/ICD: Cerebral Infarction, unspecified-I63.9 Indication:    Cerebral Infarction, unspecified CPT Code:      Echo Complete w Full Doppler-[id number] Patient History: Pertinent History: TIA, HTN, lt PCA Stroke, PE, CM, Factor V Leiden, Seizure,                    Hyper coagulability, HLD, BAT?. Study Detail: The following Echo studies were performed: 2D, M-Mode, Doppler and               color flow. Definity used as a contrast agent for endocardial               border definition and agitated saline used as a contrast agent for               intraseptal flow evaluation. Total contrast used for this               procedure was 3 mL via IV push.  PHYSICIAN INTERPRETATION: Left Ventricle: Left ventricular ejection fraction is moderately decreased, by visual estimate at 30-35%. There is global hypokinesis of the left ventricle with minor regional variations. The left ventricular cavity size is moderately dilated. Spectral Doppler shows an impaired relaxation pattern of left ventricular diastolic filling. Left Atrium: The left atrium is mildly dilated. A bubble study using agitated saline was performed. Bubble study is negative. Right Ventricle: The right ventricle is normal in size. There is normal right ventricular global systolic function. Right Atrium: The right atrium is normal in size. Aortic Valve: The aortic valve is trileaflet. The aortic valve dimensionless index is 0.62. There is no evidence of aortic valve regurgitation. The peak instantaneous gradient of the aortic valve is 5.3 mmHg. The mean gradient of the aortic valve  is 2.8 mmHg. Mitral Valve: The mitral valve is normal in structure. There is mild mitral valve regurgitation. Tricuspid Valve: The tricuspid valve was not well visualized. There is trace tricuspid regurgitation. Pulmonic Valve: The pulmonic valve is not well visualized. There is trace pulmonic valve regurgitation. Pericardium: There is no pericardial effusion noted. Aorta: The aortic root is normal. Systemic Veins: The inferior vena cava was not well visualized. In comparison to the previous echocardiogram(s): Compared with study dated 12/8/2016, no significant change.  CONCLUSIONS:  1. Poorly visualized anatomical structures due to suboptimal image quality.  2. Left ventricular ejection fraction is moderately decreased, by visual estimate at 30-35%.  3. There is global hypokinesis of the left ventricle with minor regional variations.  4. Spectral Doppler shows an impaired relaxation pattern of left ventricular diastolic filling.  5. Left ventricular cavity size is moderately dilated.  6. There is normal right ventricular global systolic function.  7. The left atrium is mildly dilated. QUANTITATIVE DATA SUMMARY: 2D MEASUREMENTS:                           Normal Ranges: LAs:           4.06 cm    (2.7-4.0cm) IVSd:          0.93 cm    (0.6-1.1cm) LVPWd:         0.88 cm    (0.6-1.1cm) LVIDd:         6.51 cm    (3.9-5.9cm) LVIDs:         5.93 cm LV Mass Index: 105.8 g/m2 LV % FS        8.8 % LA VOLUME:                               Normal Ranges: LA Vol A4C:        71.9 ml    (22+/-6mL/m2) LA Vol A2C:        73.5 ml LA Vol BP:         75.2 ml LA Vol Index A4C:  30.4ml/m2 LA Vol Index A2C:  31.1 ml/m2 LA Vol Index BP:   31.8 ml/m2 LA Area A4C:       22.9 cm2 LA Area A2C:       22.4 cm2 LA Major Axis A4C: 6.2 cm LA Major Axis A2C: 5.8 cm LA Volume Index:   31.8 ml/m2 LV SYSTOLIC FUNCTION BY 2D PLANIMETRY (MOD):                      Normal Ranges: EF-A4C View:    57 % (>=55%) EF-A2C View:    51 % EF-Biplane:     53 %  EF-Visual:      33 % LV EF Reported: 33 % LV DIASTOLIC FUNCTION:                               Normal Ranges: MV Peak E:        0.35 m/s    (0.7-1.2 m/s) MV Peak A:        0.75 m/s    (0.42-0.7 m/s) E/A Ratio:        0.47        (1.0-2.2) MV e'             0.055 m/s   (>8.0) MV lateral e'     0.07 m/s MV medial e'      0.04 m/s MV A Dur:         96.89 msec E/e' Ratio:       6.41        (<8.0) a'                0.10 m/s MV DT:            146 msec    (150-240 msec) PulmV Sys Johnathon:    48.15 cm/s PulmV Crisostomo Johnathon:   22.16 cm/s PulmV S/D Johnathon:    2.17 PulmV A Revs Johnathon: 21.75 cm/s PulmV A Revs Dur: 114.19 msec MITRAL VALVE:                 Normal Ranges: MV DT: 146 msec (150-240msec) AORTIC VALVE:                                   Normal Ranges: AoV Vmax:                1.15 m/s (<=1.7m/s) AoV Peak P.3 mmHg (<20mmHg) AoV Mean P.8 mmHg (1.7-11.5mmHg) LVOT Max Johnathon:            0.77 m/s (<=1.1m/s) AoV VTI:                 19.37 cm (18-25cm) LVOT VTI:                12.05 cm LVOT Diameter:           2.14 cm  (1.8-2.4cm) AoV Area, VTI:           2.24 cm2 (2.5-5.5cm2) AoV Area,Vmax:           2.42 cm2 (2.5-4.5cm2) AoV Dimensionless Index: 0.62  RIGHT VENTRICLE: TAPSE: 27.0 mm RV s'  0.00 m/s TRICUSPID VALVE/RVSP:                   Normal Ranges: IVC Diam: 1.30 cm PULMONIC VALVE:                         Normal Ranges: PV Accel Time: 73 msec  (>120ms) PV Max Johnathon:    0.9 m/s  (0.6-0.9m/s) PV Max PG:     3.5 mmHg Pulmonary Veins: PulmV A Revs Dur: 114.19 msec PulmV A Revs Johnathon: 21.75 cm/s PulmV Crisostomo [place]:   22.16 cm/s PulmV S/D Johnathon:    2.17 PulmV Sys Johnathon:    48.15 cm/s  [id number] Rickie Duran MD Electronically signed on 2024 at 3:22:38 PM  ** Final **        Results from last 7 days   Lab Units 24  0551   HEMOGLOBIN A1C % 6.2*     BNP   Date/Time Value Ref Range Status   2024 05:51  (H) 0 - 99 pg/mL Final       Test Results Pending At Discharge  Pending Labs       No current pending labs.             Hospital Course  Michael Reed is a 54 y.o. Right-handed male with PMH of a L PCA infarct (w/ chronic R HH, R sided hemiplegia), Factor V Leiden (on Xarelto, non-adherent), HTN, and HLD who presented to [facility] on 7/22/[facility]24 with worsening vision, neurology consulted for concern as a BAT for a potential new stroke. NIHSS was 8 for chronic findings.     His CTH/CTA were negative for any acute findings. He does have an associated headache. This would not be typical for an infarct, but CRAO could be considered (would not explain patient's ability to still fixate, blink to threat, and initially finger count normally). Ddx includes GCA given headache, age of presentation.      MRI/MRA shows a small new R PCA infarct suspected to be cardioembolic in the setting of Xarelto non-adherence. Pt reports that he takes it for a month or two and then stops due to stubbornness.     TTE with bubble study stable with estimated LVEF 30-35%.     Given new symptoms were vision-related, no PT eval was pursued. SLP found that pt's swallow and speech are at baseline.     Follow up:  Follow up with PCP within 1-2 wks of discharge  Follow up with stroke neurology - Dr. Kassie Loyd ~6 wks after discharge  Follow up with cardiology for severely reduced LVEF  Resume home Xarelto 20 mg, strongly encouraged adherence  TTE w/ bubble study stable compared to last echo in 2016 to be discussed outpatient   Further stroke work-up to be discussed outpatient   ASA 81mg daily  Atorvastatin 40mg      Pertinent Physical Exam At Time of Discharge  Physical Exam  Vitals reviewed.   Constitutional:       General: He is not in acute distress.     Appearance: He is not ill-appearing.   HENT:      Head: Normocephalic and atraumatic.   Pulmonary:      Effort: Pulmonary effort is normal. No respiratory distress.   Neurological:      Mental Status: He is alert.      Cranial Nerves: No dysarthria.       Neurological Exam  Mental Status  Alert. no  dysarthria present.    Cranial Nerves  CN II: Right homonymous hemianopsia.    Motor    Moving all extremities spontaneously.  Residual RUE weakness from prior stroke, but able to sustain anti-gravity and make a fist..    Outpatient Follow-Up  No future appointments.    CAROL Moore  MS4 - [employer]    Senior Addendum:  I personally saw, examined, and discussed the patient above. I have reviewed and agree with the note above. All edits or corrections have been made directly into the note, including the physical exam and assessment/plan    Darrian Ghosh DO   PGY4 Neurology

## 2024-08-19 PROCEDURE — RXMED WILLOW AMBULATORY MEDICATION CHARGE

## 2024-08-21 ENCOUNTER — TELEPHONE (OUTPATIENT)
Dept: NEUROLOGY | Facility: CLINIC | Age: 54
End: 2024-08-21

## 2024-08-21 NOTE — TELEPHONE ENCOUNTER
I spoke to patient who was discharged home from hospital recently. We reviewed discharge plan of care including need for follow up testing, appointments and new medications. Patient verbalized understanding of plan of care and denies any questions relating to admission. Patient did not have a hospital follow up scheduled yet I offered to assist patient but he refused due to unreliable transportation. I offered a virtual appointment but patient states he does not have a device to do virtual appointment. I gave patient our scheduling number to call back when he gets transportation and or a device with camera.

## 2024-08-22 ENCOUNTER — PHARMACY VISIT (OUTPATIENT)
Dept: PHARMACY | Facility: CLINIC | Age: 54
End: 2024-08-22
Payer: COMMERCIAL

## 2024-08-28 ENCOUNTER — APPOINTMENT (OUTPATIENT)
Dept: OPHTHALMOLOGY | Facility: CLINIC | Age: 54
End: 2024-08-28

## 2024-08-28 DIAGNOSIS — Z86.73 CHRONIC ISCHEMIC LEFT PCA STROKE: Primary | ICD-10-CM

## 2024-08-28 DIAGNOSIS — H40.003 GLAUCOMA SUSPECT OF BOTH EYES: ICD-10-CM

## 2024-08-28 DIAGNOSIS — H53.462 LEFT HOMONYMOUS HEMIANOPSIA: ICD-10-CM

## 2024-08-28 DIAGNOSIS — H53.461 RIGHT HOMONYMOUS HEMIANOPSIA: ICD-10-CM

## 2024-08-28 DIAGNOSIS — I63.531: ICD-10-CM

## 2024-08-28 PROCEDURE — 99205 OFFICE O/P NEW HI 60 MIN: CPT | Performed by: PSYCHIATRY & NEUROLOGY

## 2024-08-28 PROCEDURE — 92083 EXTENDED VISUAL FIELD XM: CPT | Performed by: PSYCHIATRY & NEUROLOGY

## 2024-08-28 ASSESSMENT — ENCOUNTER SYMPTOMS
CARDIOVASCULAR NEGATIVE: 0
MUSCULOSKELETAL NEGATIVE: 0
CONSTITUTIONAL NEGATIVE: 0
PSYCHIATRIC NEGATIVE: 0
GASTROINTESTINAL NEGATIVE: 0
EYES NEGATIVE: 1
ALLERGIC/IMMUNOLOGIC NEGATIVE: 0
ENDOCRINE NEGATIVE: 0
RESPIRATORY NEGATIVE: 0
HEMATOLOGIC/LYMPHATIC NEGATIVE: 0
NEUROLOGICAL NEGATIVE: 0

## 2024-08-28 ASSESSMENT — TONOMETRY
OD_IOP_MMHG: 12
OS_IOP_MMHG: 13
IOP_METHOD: TONOPEN

## 2024-08-28 ASSESSMENT — CONF VISUAL FIELD
OD_INFERIOR_TEMPORAL_RESTRICTION: 1
METHOD: COUNTING FINGERS
OS_SUPERIOR_NASAL_RESTRICTION: 1
OS_INFERIOR_NASAL_RESTRICTION: 1
OD_SUPERIOR_TEMPORAL_RESTRICTION: 1

## 2024-08-28 ASSESSMENT — CUP TO DISC RATIO
OS_RATIO: 0.9
OD_RATIO: 0.75

## 2024-08-28 ASSESSMENT — VISUAL ACUITY
OD_PH_SC: 20/20-1
OS_SC: 20/20-1
METHOD: SNELLEN - LINEAR
OD_SC: 20/25-2

## 2024-08-28 ASSESSMENT — SLIT LAMP EXAM - LIDS
COMMENTS: NORMAL
COMMENTS: NORMAL

## 2024-08-28 NOTE — PROGRESS NOTES
Assessment and Plan    07/23/2024 MRI brain without contrast & MRA head & neck, which I personally reviewed, show medial right occipital lobe diffusion changes consistent with acute infarction along with left posterior cerebral artery encephalomalacia and other deep white matter bihemispheric white matter FLAIR lesions.  Prior head imaging.    08/28/2024 HVF 24-2 right large and left superior homonymous hemianopsia OD fovea 37 MD -15.62 & OS fovea 36 MD -22.31.    This 54 year-old man with a history of right occipital stroke, left posterior cerebral artery stroke, HTN, factor V Leiden, PE presents for evaluation of bilateral homonymous hemianopsias.    This patient has new left homonymous hemianopsia corresponding to the right cortical damage as expected and chronic right homonymous hemianopsia corresponding to the left cortical damage. I discussed the permanent nature of these findings and related restrictions on driving as well as potential referral to the [facility] for low vision services.     Cup to disc ratio is highly suspicious for glaucoma. I recommend establishing care to preserve vision as much as possible.    Plan    Secondary risk reduction.  [facility] referral.  No driving.  Glaucoma referral.    Follow up with glaucoma service. (Dilated 8/28/2024)

## 2024-10-07 PROCEDURE — RXMED WILLOW AMBULATORY MEDICATION CHARGE

## 2024-10-08 ENCOUNTER — PHARMACY VISIT (OUTPATIENT)
Dept: PHARMACY | Facility: CLINIC | Age: 54
End: 2024-10-08
Payer: COMMERCIAL

## 2024-12-16 ENCOUNTER — HOSPITAL ENCOUNTER (INPATIENT)
Facility: HOSPITAL | Age: 54
LOS: 4 days | Discharge: NURSING, REHAB OR LONG-TERM CARE | End: 2024-12-20
Attending: EMERGENCY MEDICINE | Admitting: PSYCHIATRY & NEUROLOGY
Payer: MEDICARE

## 2024-12-16 ENCOUNTER — APPOINTMENT (OUTPATIENT)
Dept: RADIOLOGY | Facility: HOSPITAL | Age: 54
End: 2024-12-16
Payer: MEDICARE

## 2024-12-16 ENCOUNTER — APPOINTMENT (OUTPATIENT)
Dept: CARDIOLOGY | Facility: HOSPITAL | Age: 54
End: 2024-12-16
Payer: MEDICARE

## 2024-12-16 ENCOUNTER — CLINICAL SUPPORT (OUTPATIENT)
Dept: EMERGENCY MEDICINE | Facility: HOSPITAL | Age: 54
End: 2024-12-16
Payer: MEDICARE

## 2024-12-16 DIAGNOSIS — T68.XXXA HYPOTHERMIA, INITIAL ENCOUNTER: ICD-10-CM

## 2024-12-16 DIAGNOSIS — I63.422 STROKE DUE TO EMBOLISM OF LEFT ANTERIOR CEREBRAL ARTERY (MULTI): Primary | ICD-10-CM

## 2024-12-16 DIAGNOSIS — F10.10 ALCOHOL ABUSE: Chronic | ICD-10-CM

## 2024-12-16 DIAGNOSIS — R47.01 EXPRESSIVE APHASIA: ICD-10-CM

## 2024-12-16 LAB
ALBUMIN SERPL BCP-MCNC: 4.4 G/DL (ref 3.4–5)
ALP SERPL-CCNC: 94 U/L (ref 33–120)
ALT SERPL W P-5'-P-CCNC: 23 U/L (ref 10–52)
ANION GAP BLDV CALCULATED.4IONS-SCNC: 17 MMOL/L (ref 10–25)
ANION GAP SERPL CALC-SCNC: 20 MMOL/L (ref 10–20)
APTT PPP: 23 SECONDS (ref 27–38)
AST SERPL W P-5'-P-CCNC: 88 U/L (ref 9–39)
ATRIAL RATE: 105 BPM
BASE EXCESS BLDV CALC-SCNC: 0.5 MMOL/L (ref -2–3)
BASOPHILS # BLD AUTO: 0.03 X10*3/UL (ref 0–0.1)
BASOPHILS NFR BLD AUTO: 0.3 %
BILIRUB SERPL-MCNC: 3 MG/DL (ref 0–1.2)
BNP SERPL-MCNC: 70 PG/ML (ref 0–99)
BODY TEMPERATURE: 37 DEGREES CELSIUS
BUN SERPL-MCNC: 19 MG/DL (ref 6–23)
CA-I BLDV-SCNC: 1.15 MMOL/L (ref 1.1–1.33)
CALCIUM SERPL-MCNC: 9.7 MG/DL (ref 8.6–10.6)
CARDIAC TROPONIN I PNL SERPL HS: 143 NG/L (ref 0–53)
CARDIAC TROPONIN I PNL SERPL HS: 148 NG/L (ref 0–53)
CHLORIDE BLDV-SCNC: 104 MMOL/L (ref 98–107)
CHLORIDE SERPL-SCNC: 104 MMOL/L (ref 98–107)
CHOLEST SERPL-MCNC: 178 MG/DL (ref 0–199)
CHOLESTEROL/HDL RATIO: 3.6
CK SERPL-CCNC: 8577 U/L (ref 0–325)
CO2 SERPL-SCNC: 24 MMOL/L (ref 21–32)
CREAT SERPL-MCNC: 1.77 MG/DL (ref 0.5–1.3)
EGFRCR SERPLBLD CKD-EPI 2021: 45 ML/MIN/1.73M*2
EOSINOPHIL # BLD AUTO: 0.01 X10*3/UL (ref 0–0.7)
EOSINOPHIL NFR BLD AUTO: 0.1 %
ERYTHROCYTE [DISTWIDTH] IN BLOOD BY AUTOMATED COUNT: 16.6 % (ref 11.5–14.5)
EST. AVERAGE GLUCOSE BLD GHB EST-MCNC: 131 MG/DL
GLUCOSE BLD MANUAL STRIP-MCNC: 104 MG/DL (ref 74–99)
GLUCOSE BLDV-MCNC: 93 MG/DL (ref 74–99)
GLUCOSE SERPL-MCNC: 102 MG/DL (ref 74–99)
HBA1C MFR BLD: 6.2 %
HCO3 BLDV-SCNC: 25.4 MMOL/L (ref 22–26)
HCT VFR BLD AUTO: 39 % (ref 41–52)
HCT VFR BLD EST: 42 % (ref 41–52)
HDLC SERPL-MCNC: 49.5 MG/DL
HGB BLD-MCNC: 13.1 G/DL (ref 13.5–17.5)
HGB BLDV-MCNC: 14.1 G/DL (ref 13.5–17.5)
IMM GRANULOCYTES # BLD AUTO: 0.02 X10*3/UL (ref 0–0.7)
IMM GRANULOCYTES NFR BLD AUTO: 0.2 % (ref 0–0.9)
INR PPP: 1.1 (ref 0.9–1.1)
LACTATE BLDV-SCNC: 1.9 MMOL/L (ref 0.4–2)
LDLC SERPL CALC-MCNC: 110 MG/DL
LYMPHOCYTES # BLD AUTO: 1.22 X10*3/UL (ref 1.2–4.8)
LYMPHOCYTES NFR BLD AUTO: 12.2 %
MCH RBC QN AUTO: 28.1 PG (ref 26–34)
MCHC RBC AUTO-ENTMCNC: 33.6 G/DL (ref 32–36)
MCV RBC AUTO: 84 FL (ref 80–100)
MONOCYTES # BLD AUTO: 0.84 X10*3/UL (ref 0.1–1)
MONOCYTES NFR BLD AUTO: 8.4 %
NEUTROPHILS # BLD AUTO: 7.9 X10*3/UL (ref 1.2–7.7)
NEUTROPHILS NFR BLD AUTO: 78.8 %
NON HDL CHOLESTEROL: 129 MG/DL (ref 0–149)
NRBC BLD-RTO: 0 /100 WBCS (ref 0–0)
OXYHGB MFR BLDV: 47.1 % (ref 45–75)
P AXIS: 71 DEGREES
P OFFSET: 210 MS
P ONSET: 140 MS
PCO2 BLDV: 41 MM HG (ref 41–51)
PH BLDV: 7.4 PH (ref 7.33–7.43)
PLATELET # BLD AUTO: 236 X10*3/UL (ref 150–450)
PO2 BLDV: 31 MM HG (ref 35–45)
POTASSIUM BLDV-SCNC: 3.5 MMOL/L (ref 3.5–5.3)
POTASSIUM SERPL-SCNC: 3.5 MMOL/L (ref 3.5–5.3)
PR INTERVAL: 144 MS
PROT SERPL-MCNC: 8.3 G/DL (ref 6.4–8.2)
PROTHROMBIN TIME: 12.5 SECONDS (ref 9.8–12.8)
Q ONSET: 212 MS
QRS COUNT: 17 BEATS
QRS DURATION: 100 MS
QT INTERVAL: 370 MS
QTC CALCULATION(BAZETT): 489 MS
QTC FREDERICIA: 445 MS
R AXIS: 57 DEGREES
RBC # BLD AUTO: 4.67 X10*6/UL (ref 4.5–5.9)
SAO2 % BLDV: 48 % (ref 45–75)
SODIUM BLDV-SCNC: 143 MMOL/L (ref 136–145)
SODIUM SERPL-SCNC: 144 MMOL/L (ref 136–145)
T AXIS: -26 DEGREES
T OFFSET: 397 MS
TRIGL SERPL-MCNC: 92 MG/DL (ref 0–149)
VENTRICULAR RATE: 105 BPM
VLDL: 18 MG/DL (ref 0–40)
WBC # BLD AUTO: 10 X10*3/UL (ref 4.4–11.3)

## 2024-12-16 PROCEDURE — 93005 ELECTROCARDIOGRAM TRACING: CPT

## 2024-12-16 PROCEDURE — 99291 CRITICAL CARE FIRST HOUR: CPT | Mod: 25 | Performed by: EMERGENCY MEDICINE

## 2024-12-16 PROCEDURE — RXMED WILLOW AMBULATORY MEDICATION CHARGE

## 2024-12-16 PROCEDURE — 99285 EMERGENCY DEPT VISIT HI MDM: CPT | Performed by: EMERGENCY MEDICINE

## 2024-12-16 PROCEDURE — 72125 CT NECK SPINE W/O DYE: CPT | Performed by: RADIOLOGY

## 2024-12-16 PROCEDURE — 1200000002 HC GENERAL ROOM WITH TELEMETRY DAILY

## 2024-12-16 PROCEDURE — 84484 ASSAY OF TROPONIN QUANT: CPT | Performed by: EMERGENCY MEDICINE

## 2024-12-16 PROCEDURE — 80053 COMPREHEN METABOLIC PANEL: CPT

## 2024-12-16 PROCEDURE — 82550 ASSAY OF CK (CPK): CPT

## 2024-12-16 PROCEDURE — 93010 ELECTROCARDIOGRAM REPORT: CPT | Performed by: EMERGENCY MEDICINE

## 2024-12-16 PROCEDURE — 85730 THROMBOPLASTIN TIME PARTIAL: CPT | Performed by: EMERGENCY MEDICINE

## 2024-12-16 PROCEDURE — 36415 COLL VENOUS BLD VENIPUNCTURE: CPT | Performed by: EMERGENCY MEDICINE

## 2024-12-16 PROCEDURE — 70450 CT HEAD/BRAIN W/O DYE: CPT

## 2024-12-16 PROCEDURE — 83718 ASSAY OF LIPOPROTEIN: CPT

## 2024-12-16 PROCEDURE — 72125 CT NECK SPINE W/O DYE: CPT

## 2024-12-16 PROCEDURE — 83880 ASSAY OF NATRIURETIC PEPTIDE: CPT

## 2024-12-16 PROCEDURE — 70450 CT HEAD/BRAIN W/O DYE: CPT | Performed by: RADIOLOGY

## 2024-12-16 PROCEDURE — 85610 PROTHROMBIN TIME: CPT | Performed by: EMERGENCY MEDICINE

## 2024-12-16 PROCEDURE — 93010 ELECTROCARDIOGRAM REPORT: CPT | Performed by: INTERNAL MEDICINE

## 2024-12-16 PROCEDURE — 2550000001 HC RX 255 CONTRASTS: Performed by: EMERGENCY MEDICINE

## 2024-12-16 PROCEDURE — 82435 ASSAY OF BLOOD CHLORIDE: CPT | Performed by: EMERGENCY MEDICINE

## 2024-12-16 PROCEDURE — 99285 EMERGENCY DEPT VISIT HI MDM: CPT | Mod: 25 | Performed by: EMERGENCY MEDICINE

## 2024-12-16 PROCEDURE — 84132 ASSAY OF SERUM POTASSIUM: CPT

## 2024-12-16 PROCEDURE — 99223 1ST HOSP IP/OBS HIGH 75: CPT

## 2024-12-16 PROCEDURE — 85025 COMPLETE CBC W/AUTO DIFF WBC: CPT | Performed by: EMERGENCY MEDICINE

## 2024-12-16 PROCEDURE — 70498 CT ANGIOGRAPHY NECK: CPT | Performed by: RADIOLOGY

## 2024-12-16 PROCEDURE — 82947 ASSAY GLUCOSE BLOOD QUANT: CPT

## 2024-12-16 PROCEDURE — 83036 HEMOGLOBIN GLYCOSYLATED A1C: CPT

## 2024-12-16 PROCEDURE — 70498 CT ANGIOGRAPHY NECK: CPT

## 2024-12-16 PROCEDURE — 70496 CT ANGIOGRAPHY HEAD: CPT | Performed by: RADIOLOGY

## 2024-12-16 PROCEDURE — 2500000004 HC RX 250 GENERAL PHARMACY W/ HCPCS (ALT 636 FOR OP/ED)

## 2024-12-16 PROCEDURE — 84132 ASSAY OF SERUM POTASSIUM: CPT | Performed by: EMERGENCY MEDICINE

## 2024-12-16 RX ORDER — ATORVASTATIN CALCIUM 40 MG/1
40 TABLET, FILM COATED ORAL NIGHTLY
Status: DISCONTINUED | OUTPATIENT
Start: 2024-12-16 | End: 2024-12-20 | Stop reason: HOSPADM

## 2024-12-16 RX ORDER — THIAMINE HYDROCHLORIDE 100 MG/ML
500 INJECTION, SOLUTION INTRAMUSCULAR; INTRAVENOUS EVERY 8 HOURS SCHEDULED
Status: COMPLETED | OUTPATIENT
Start: 2024-12-16 | End: 2024-12-19

## 2024-12-16 RX ORDER — HYDRALAZINE HYDROCHLORIDE 20 MG/ML
10 INJECTION INTRAMUSCULAR; INTRAVENOUS
Status: ACTIVE | OUTPATIENT
Start: 2024-12-16 | End: 2024-12-18

## 2024-12-16 RX ORDER — FOLIC ACID 1 MG/1
1 TABLET ORAL DAILY
Status: DISCONTINUED | OUTPATIENT
Start: 2024-12-17 | End: 2024-12-20 | Stop reason: HOSPADM

## 2024-12-16 RX ORDER — LEVETIRACETAM 10 MG/ML
INJECTION INTRAVASCULAR
Status: COMPLETED
Start: 2024-12-16 | End: 2024-12-16

## 2024-12-16 RX ORDER — HYDRALAZINE HYDROCHLORIDE 25 MG/1
25 TABLET, FILM COATED ORAL EVERY 6 HOURS PRN
Status: DISCONTINUED | OUTPATIENT
Start: 2024-12-18 | End: 2024-12-20 | Stop reason: HOSPADM

## 2024-12-16 RX ORDER — MULTIVIT-MIN/IRON FUM/FOLIC AC 7.5 MG-4
1 TABLET ORAL DAILY
Status: DISCONTINUED | OUTPATIENT
Start: 2024-12-17 | End: 2024-12-20 | Stop reason: HOSPADM

## 2024-12-16 RX ORDER — PAROXETINE HYDROCHLORIDE 20 MG/1
20 TABLET, FILM COATED ORAL DAILY
Status: DISCONTINUED | OUTPATIENT
Start: 2024-12-17 | End: 2024-12-20 | Stop reason: HOSPADM

## 2024-12-16 RX ORDER — ATORVASTATIN CALCIUM 20 MG/1
80 TABLET, FILM COATED ORAL NIGHTLY
Status: DISCONTINUED | OUTPATIENT
Start: 2024-12-16 | End: 2024-12-16

## 2024-12-16 RX ORDER — POLYETHYLENE GLYCOL 3350 17 G/17G
17 POWDER, FOR SOLUTION ORAL DAILY
Status: DISCONTINUED | OUTPATIENT
Start: 2024-12-16 | End: 2024-12-20 | Stop reason: HOSPADM

## 2024-12-16 RX ORDER — LEVETIRACETAM 5 MG/ML
INJECTION INTRAVASCULAR
Status: COMPLETED
Start: 2024-12-16 | End: 2024-12-16

## 2024-12-16 RX ORDER — LABETALOL HYDROCHLORIDE 5 MG/ML
10 INJECTION, SOLUTION INTRAVENOUS EVERY 10 MIN PRN
Status: ACTIVE | OUTPATIENT
Start: 2024-12-16 | End: 2024-12-18

## 2024-12-16 RX ORDER — LANOLIN ALCOHOL/MO/W.PET/CERES
100 CREAM (GRAM) TOPICAL DAILY
Status: DISCONTINUED | OUTPATIENT
Start: 2024-12-19 | End: 2024-12-20 | Stop reason: HOSPADM

## 2024-12-16 RX ORDER — GABAPENTIN 300 MG/1
300 CAPSULE ORAL EVERY 8 HOURS SCHEDULED
Status: DISCONTINUED | OUTPATIENT
Start: 2024-12-16 | End: 2024-12-20 | Stop reason: HOSPADM

## 2024-12-16 RX ADMIN — IOHEXOL 70 ML: 350 INJECTION, SOLUTION INTRAVENOUS at 19:45

## 2024-12-16 RX ADMIN — LEVETIRACETAM: 5 INJECTION INTRAVENOUS at 19:51

## 2024-12-16 RX ADMIN — LEVETIRACETAM 1000 MG: 10 INJECTION INTRAVENOUS at 19:50

## 2024-12-16 ASSESSMENT — PAIN SCALES - WONG BAKER: WONGBAKER_NUMERICALRESPONSE: NO HURT

## 2024-12-16 ASSESSMENT — COGNITIVE AND FUNCTIONAL STATUS - GENERAL
WALKING IN HOSPITAL ROOM: A LOT
DRESSING REGULAR LOWER BODY CLOTHING: TOTAL
MOVING FROM LYING ON BACK TO SITTING ON SIDE OF FLAT BED WITH BEDRAILS: A LITTLE
TURNING FROM BACK TO SIDE WHILE IN FLAT BAD: A LITTLE
TOILETING: TOTAL
PERSONAL GROOMING: TOTAL
EATING MEALS: TOTAL
STANDING UP FROM CHAIR USING ARMS: A LITTLE
MOBILITY SCORE: 15
MOVING TO AND FROM BED TO CHAIR: A LOT
DRESSING REGULAR UPPER BODY CLOTHING: TOTAL
HELP NEEDED FOR BATHING: TOTAL
CLIMB 3 TO 5 STEPS WITH RAILING: A LOT
DAILY ACTIVITIY SCORE: 6

## 2024-12-16 ASSESSMENT — LIFESTYLE VARIABLES
HEADACHE, FULLNESS IN HEAD: NOT PRESENT
VISUAL DISTURBANCES: NOT PRESENT
AUDITORY DISTURBANCES: NOT PRESENT
PAROXYSMAL SWEATS: BARELY PERCEPTIBLE SWEATING, PALMS MOIST
TREMOR: NO TREMOR
TOTAL SCORE: 2
ORIENTATION AND CLOUDING OF SENSORIUM: CANNOT DO SERIAL ADDITIONS OR IS UNCERTAIN ABOUT DATE
AGITATION: NORMAL ACTIVITY
NAUSEA AND VOMITING: NO NAUSEA AND NO VOMITING
ANXIETY: NO ANXIETY, AT EASE

## 2024-12-16 ASSESSMENT — PAIN SCALES - PAIN ASSESSMENT IN ADVANCED DEMENTIA (PAINAD)
BREATHING: NORMAL
CONSOLABILITY: NO NEED TO CONSOLE
BODYLANGUAGE: RELAXED
FACIALEXPRESSION: SMILING OR INEXPRESSIVE
TOTALSCORE: 0

## 2024-12-16 ASSESSMENT — COLUMBIA-SUICIDE SEVERITY RATING SCALE - C-SSRS
6. HAVE YOU EVER DONE ANYTHING, STARTED TO DO ANYTHING, OR PREPARED TO DO ANYTHING TO END YOUR LIFE?: NO
1. IN THE PAST MONTH, HAVE YOU WISHED YOU WERE DEAD OR WISHED YOU COULD GO TO SLEEP AND NOT WAKE UP?: NO
2. HAVE YOU ACTUALLY HAD ANY THOUGHTS OF KILLING YOURSELF?: NO

## 2024-12-16 ASSESSMENT — PAIN SCALES - GENERAL: PAINLEVEL_OUTOF10: 0 - NO PAIN

## 2024-12-17 ENCOUNTER — APPOINTMENT (OUTPATIENT)
Dept: CARDIOLOGY | Facility: HOSPITAL | Age: 54
End: 2024-12-17
Payer: MEDICARE

## 2024-12-17 ENCOUNTER — APPOINTMENT (OUTPATIENT)
Dept: NEUROLOGY | Facility: HOSPITAL | Age: 54
End: 2024-12-17
Payer: MEDICARE

## 2024-12-17 LAB
ALBUMIN SERPL BCP-MCNC: 4 G/DL (ref 3.4–5)
AMPHETAMINES UR QL SCN: NORMAL
ANION GAP SERPL CALC-SCNC: 17 MMOL/L (ref 10–20)
AORTIC VALVE MEAN GRADIENT: 3 MMHG
AORTIC VALVE PEAK VELOCITY: 1.19 M/S
ATRIAL RATE: 101 BPM
AV PEAK GRADIENT: 6 MMHG
AVA (PEAK VEL): 3.3 CM2
AVA (VTI): 2.99 CM2
BARBITURATES UR QL SCN: NORMAL
BASOPHILS # BLD AUTO: 0.03 X10*3/UL (ref 0–0.1)
BASOPHILS NFR BLD AUTO: 0.3 %
BENZODIAZ UR QL SCN: NORMAL
BUN SERPL-MCNC: 17 MG/DL (ref 6–23)
BZE UR QL SCN: NORMAL
CALCIUM SERPL-MCNC: 9.1 MG/DL (ref 8.6–10.6)
CANNABINOIDS UR QL SCN: NORMAL
CHLORIDE SERPL-SCNC: 107 MMOL/L (ref 98–107)
CO2 SERPL-SCNC: 24 MMOL/L (ref 21–32)
CREAT SERPL-MCNC: 1.56 MG/DL (ref 0.5–1.3)
EGFRCR SERPLBLD CKD-EPI 2021: 52 ML/MIN/1.73M*2
EJECTION FRACTION APICAL 4 CHAMBER: 37.2
EJECTION FRACTION: 40 %
EOSINOPHIL # BLD AUTO: 0.07 X10*3/UL (ref 0–0.7)
EOSINOPHIL NFR BLD AUTO: 0.7 %
ERYTHROCYTE [DISTWIDTH] IN BLOOD BY AUTOMATED COUNT: 17.6 % (ref 11.5–14.5)
FENTANYL+NORFENTANYL UR QL SCN: NORMAL
GLUCOSE BLD MANUAL STRIP-MCNC: 107 MG/DL (ref 74–99)
GLUCOSE BLD MANUAL STRIP-MCNC: 111 MG/DL (ref 74–99)
GLUCOSE BLD MANUAL STRIP-MCNC: 99 MG/DL (ref 74–99)
GLUCOSE SERPL-MCNC: 86 MG/DL (ref 74–99)
HCT VFR BLD AUTO: 42.7 % (ref 41–52)
HGB BLD-MCNC: 13 G/DL (ref 13.5–17.5)
IMM GRANULOCYTES # BLD AUTO: 0.03 X10*3/UL (ref 0–0.7)
IMM GRANULOCYTES NFR BLD AUTO: 0.3 % (ref 0–0.9)
LEFT ATRIUM VOLUME AREA LENGTH INDEX BSA: 16.8 ML/M2
LEFT VENTRICLE INTERNAL DIMENSION DIASTOLE: 5.8 CM (ref 3.5–6)
LEFT VENTRICULAR OUTFLOW TRACT DIAMETER: 2.4 CM
LYMPHOCYTES # BLD AUTO: 1.77 X10*3/UL (ref 1.2–4.8)
LYMPHOCYTES NFR BLD AUTO: 18.6 %
MAGNESIUM SERPL-MCNC: 2.32 MG/DL (ref 1.6–2.4)
MCH RBC QN AUTO: 27.7 PG (ref 26–34)
MCHC RBC AUTO-ENTMCNC: 30.4 G/DL (ref 32–36)
MCV RBC AUTO: 91 FL (ref 80–100)
METHADONE UR QL SCN: NORMAL
MITRAL VALVE E/A RATIO: 0.57
MONOCYTES # BLD AUTO: 0.86 X10*3/UL (ref 0.1–1)
MONOCYTES NFR BLD AUTO: 9 %
NEUTROPHILS # BLD AUTO: 6.78 X10*3/UL (ref 1.2–7.7)
NEUTROPHILS NFR BLD AUTO: 71.1 %
NRBC BLD-RTO: 0 /100 WBCS (ref 0–0)
OPIATES UR QL SCN: NORMAL
OXYCODONE+OXYMORPHONE UR QL SCN: NORMAL
P AXIS: 39 DEGREES
P OFFSET: 208 MS
P ONSET: 140 MS
PCP UR QL SCN: NORMAL
PHOSPHATE SERPL-MCNC: 3.1 MG/DL (ref 2.5–4.9)
PLATELET # BLD AUTO: 198 X10*3/UL (ref 150–450)
POTASSIUM SERPL-SCNC: 3.6 MMOL/L (ref 3.5–5.3)
PR INTERVAL: 144 MS
Q ONSET: 212 MS
QRS COUNT: 16 BEATS
QRS DURATION: 100 MS
QT INTERVAL: 370 MS
QTC CALCULATION(BAZETT): 479 MS
QTC FREDERICIA: 440 MS
R AXIS: 0 DEGREES
RBC # BLD AUTO: 4.69 X10*6/UL (ref 4.5–5.9)
RIGHT VENTRICLE FREE WALL PEAK S': 14.7 CM/S
RIGHT VENTRICLE PEAK SYSTOLIC PRESSURE: 20.6 MMHG
SODIUM SERPL-SCNC: 144 MMOL/L (ref 136–145)
T AXIS: 184 DEGREES
T OFFSET: 397 MS
TRICUSPID ANNULAR PLANE SYSTOLIC EXCURSION: 2 CM
VENTRICULAR RATE: 101 BPM
WBC # BLD AUTO: 9.5 X10*3/UL (ref 4.4–11.3)

## 2024-12-17 PROCEDURE — 80307 DRUG TEST PRSMV CHEM ANLYZR: CPT

## 2024-12-17 PROCEDURE — 2500000001 HC RX 250 WO HCPCS SELF ADMINISTERED DRUGS (ALT 637 FOR MEDICARE OP)

## 2024-12-17 PROCEDURE — 95715 VEEG EA 12-26HR INTMT MNTR: CPT

## 2024-12-17 PROCEDURE — 2500000004 HC RX 250 GENERAL PHARMACY W/ HCPCS (ALT 636 FOR OP/ED)

## 2024-12-17 PROCEDURE — 84100 ASSAY OF PHOSPHORUS: CPT

## 2024-12-17 PROCEDURE — 83735 ASSAY OF MAGNESIUM: CPT

## 2024-12-17 PROCEDURE — 1200000002 HC GENERAL ROOM WITH TELEMETRY DAILY

## 2024-12-17 PROCEDURE — 36415 COLL VENOUS BLD VENIPUNCTURE: CPT

## 2024-12-17 PROCEDURE — 97161 PT EVAL LOW COMPLEX 20 MIN: CPT | Mod: GP

## 2024-12-17 PROCEDURE — 95700 EEG CONT REC W/VID EEG TECH: CPT

## 2024-12-17 PROCEDURE — 85025 COMPLETE CBC W/AUTO DIFF WBC: CPT

## 2024-12-17 PROCEDURE — 95720 EEG PHY/QHP EA INCR W/VEEG: CPT | Performed by: STUDENT IN AN ORGANIZED HEALTH CARE EDUCATION/TRAINING PROGRAM

## 2024-12-17 PROCEDURE — 93306 TTE W/DOPPLER COMPLETE: CPT | Performed by: INTERNAL MEDICINE

## 2024-12-17 PROCEDURE — 92523 SPEECH SOUND LANG COMPREHEN: CPT | Mod: GN

## 2024-12-17 PROCEDURE — 97165 OT EVAL LOW COMPLEX 30 MIN: CPT | Mod: GO

## 2024-12-17 PROCEDURE — C8929 TTE W OR WO FOL WCON,DOPPLER: HCPCS

## 2024-12-17 PROCEDURE — 82947 ASSAY GLUCOSE BLOOD QUANT: CPT

## 2024-12-17 PROCEDURE — 92610 EVALUATE SWALLOWING FUNCTION: CPT | Mod: GN

## 2024-12-17 RX ORDER — NAPROXEN SODIUM 220 MG/1
81 TABLET, FILM COATED ORAL
Status: DISCONTINUED | OUTPATIENT
Start: 2024-12-17 | End: 2024-12-20 | Stop reason: HOSPADM

## 2024-12-17 RX ORDER — ENOXAPARIN SODIUM 100 MG/ML
40 INJECTION SUBCUTANEOUS DAILY
Status: DISCONTINUED | OUTPATIENT
Start: 2024-12-17 | End: 2024-12-20

## 2024-12-17 RX ORDER — FLUTICASONE FUROATE AND VILANTEROL 200; 25 UG/1; UG/1
1 POWDER RESPIRATORY (INHALATION) DAILY
Status: DISCONTINUED | OUTPATIENT
Start: 2024-12-17 | End: 2024-12-20 | Stop reason: HOSPADM

## 2024-12-17 RX ADMIN — GABAPENTIN 300 MG: 300 CAPSULE ORAL at 14:56

## 2024-12-17 RX ADMIN — ASPIRIN 81 MG: 81 TABLET, CHEWABLE ORAL at 16:28

## 2024-12-17 RX ADMIN — PERFLUTREN 3 ML OF DILUTION: 6.52 INJECTION, SUSPENSION INTRAVENOUS at 10:53

## 2024-12-17 RX ADMIN — THIAMINE HYDROCHLORIDE 500 MG: 100 INJECTION, SOLUTION INTRAMUSCULAR; INTRAVENOUS at 08:32

## 2024-12-17 RX ADMIN — THIAMINE HYDROCHLORIDE 500 MG: 100 INJECTION, SOLUTION INTRAMUSCULAR; INTRAVENOUS at 00:37

## 2024-12-17 RX ADMIN — GABAPENTIN 300 MG: 300 CAPSULE ORAL at 22:18

## 2024-12-17 RX ADMIN — ATORVASTATIN CALCIUM 40 MG: 40 TABLET, FILM COATED ORAL at 20:50

## 2024-12-17 RX ADMIN — ENOXAPARIN SODIUM 40 MG: 100 INJECTION SUBCUTANEOUS at 12:05

## 2024-12-17 RX ADMIN — THIAMINE HYDROCHLORIDE 500 MG: 100 INJECTION, SOLUTION INTRAMUSCULAR; INTRAVENOUS at 15:02

## 2024-12-17 ASSESSMENT — COGNITIVE AND FUNCTIONAL STATUS - GENERAL
DRESSING REGULAR UPPER BODY CLOTHING: A LOT
TOILETING: TOTAL
CLIMB 3 TO 5 STEPS WITH RAILING: TOTAL
PERSONAL GROOMING: TOTAL
DAILY ACTIVITIY SCORE: 6
MOVING TO AND FROM BED TO CHAIR: A LOT
WALKING IN HOSPITAL ROOM: TOTAL
HELP NEEDED FOR BATHING: TOTAL
STANDING UP FROM CHAIR USING ARMS: A LITTLE
HELP NEEDED FOR BATHING: TOTAL
PERSONAL GROOMING: A LOT
MOVING FROM LYING ON BACK TO SITTING ON SIDE OF FLAT BED WITH BEDRAILS: A LOT
DAILY ACTIVITIY SCORE: 8
MOBILITY SCORE: 8
STANDING UP FROM CHAIR USING ARMS: TOTAL
EATING MEALS: TOTAL
CLIMB 3 TO 5 STEPS WITH RAILING: A LOT
MOVING FROM LYING ON BACK TO SITTING ON SIDE OF FLAT BED WITH BEDRAILS: A LITTLE
TURNING FROM BACK TO SIDE WHILE IN FLAT BAD: A LITTLE
WALKING IN HOSPITAL ROOM: A LOT
EATING MEALS: TOTAL
MOVING TO AND FROM BED TO CHAIR: TOTAL
DRESSING REGULAR LOWER BODY CLOTHING: TOTAL
DRESSING REGULAR UPPER BODY CLOTHING: TOTAL
TOILETING: TOTAL
TURNING FROM BACK TO SIDE WHILE IN FLAT BAD: A LOT
DRESSING REGULAR LOWER BODY CLOTHING: TOTAL
MOBILITY SCORE: 15

## 2024-12-17 ASSESSMENT — PAIN SCALES - PAIN ASSESSMENT IN ADVANCED DEMENTIA (PAINAD)
BREATHING: NORMAL
BODYLANGUAGE: RELAXED
FACIALEXPRESSION: SMILING OR INEXPRESSIVE
BODYLANGUAGE: RELAXED
FACIALEXPRESSION: SMILING OR INEXPRESSIVE
TOTALSCORE: 0
BREATHING: NORMAL
TOTALSCORE: 0
CONSOLABILITY: NO NEED TO CONSOLE
FACIALEXPRESSION: SMILING OR INEXPRESSIVE
FACIALEXPRESSION: SMILING OR INEXPRESSIVE
TOTALSCORE: 0
CONSOLABILITY: NO NEED TO CONSOLE
BODYLANGUAGE: RELAXED
BREATHING: NORMAL
BODYLANGUAGE: RELAXED
TOTALSCORE: 0
BREATHING: NORMAL
CONSOLABILITY: NO NEED TO CONSOLE
CONSOLABILITY: NO NEED TO CONSOLE

## 2024-12-17 ASSESSMENT — LIFESTYLE VARIABLES
HEADACHE, FULLNESS IN HEAD: NOT PRESENT
AUDITORY DISTURBANCES: NOT PRESENT
ORIENTATION AND CLOUDING OF SENSORIUM: CANNOT DO SERIAL ADDITIONS OR IS UNCERTAIN ABOUT DATE
TREMOR: NO TREMOR
NAUSEA AND VOMITING: NO NAUSEA AND NO VOMITING
PAROXYSMAL SWEATS: NO SWEAT VISIBLE
TOTAL SCORE: 0
TREMOR: NO TREMOR
HEADACHE, FULLNESS IN HEAD: NOT PRESENT
NAUSEA AND VOMITING: NO NAUSEA AND NO VOMITING
ORIENTATION AND CLOUDING OF SENSORIUM: ORIENTED AND CAN DO SERIAL ADDITIONS
ANXIETY: NO ANXIETY, AT EASE
TOTAL SCORE: 3
VISUAL DISTURBANCES: NOT PRESENT
ANXIETY: NO ANXIETY, AT EASE
AGITATION: NORMAL ACTIVITY
TREMOR: NO TREMOR
PAROXYSMAL SWEATS: 2
TOTAL SCORE: 0
NAUSEA AND VOMITING: NO NAUSEA AND NO VOMITING
AGITATION: NORMAL ACTIVITY
HEADACHE, FULLNESS IN HEAD: NOT PRESENT
AUDITORY DISTURBANCES: NOT PRESENT
ORIENTATION AND CLOUDING OF SENSORIUM: ORIENTED AND CAN DO SERIAL ADDITIONS
VISUAL DISTURBANCES: NOT PRESENT
VISUAL DISTURBANCES: NOT PRESENT
AGITATION: NORMAL ACTIVITY
PAROXYSMAL SWEATS: NO SWEAT VISIBLE
ANXIETY: NO ANXIETY, AT EASE
AUDITORY DISTURBANCES: NOT PRESENT

## 2024-12-17 ASSESSMENT — ACTIVITIES OF DAILY LIVING (ADL): BATHING_ASSISTANCE: TOTAL

## 2024-12-17 ASSESSMENT — PAIN - FUNCTIONAL ASSESSMENT
PAIN_FUNCTIONAL_ASSESSMENT: 0-10
PAIN_FUNCTIONAL_ASSESSMENT: 0-10

## 2024-12-17 NOTE — ED TRIAGE NOTES
Pt found on front porch naked lying down, neighbor call 911 , unsure what transpired this event and on arrival pt was not acting appropriately some limited movement in limbs and a BAT was called.

## 2024-12-17 NOTE — H&P
[facility] Call Center    Phone Message    May a detailed message be left on voicemail: yes     Reason for Call: Medication Question or concern regarding medication   Prescription Clarification  Name of Medication: Pt is unsure which two medication but is assuming it is one of the new medication that was prescribed today.    Prescribing Provider: Dr. Lawler   Pharmacy:  [facility] [id number] [place], [place] - [address] [facility] [address]     What on the order needs clarification? Pt went to  her prescription and was told by the pharmacy that they would not fill the medication until they heard back from Dr. Anaya's office regarding the two medication that have adverse reactions with one another?    Pt would like if someone could call her back to discuss about the two medication interaction and also if someone could reach out to the pharmacy regarding this issue.        Action Taken: Message routed to:  [facility] Adult Rheumatology       History Of Present Illness  Michael Reed is a 54 y.o. right handed male with history of L PCA stroke 2016 (w/ R HH and right hemiplegia), R PCA stroke 7/2024 both in setting of non-adherent to xarelto, Factor V Leiden (non-adherence to xarelto), HTN, HLD presenting as a BAT after being found down.     Last known well: unknown - possibly 48 hours ago. /109, .     History obtained from EMS and collateral over the phone from ex-wife, Tiffany Reed ([phone]). She reports she last saw him 3 days prior. Pt's neighbour found him on the porch naked down for an unknown duration. At baseline post recent strokes - he has significant right sided hemiparesis - very limited movement of RUE, able to walk short distances using cane - right HH - otherwise had normal speech. mRS 3 - able to walk using a cane and do basic ADLs but unable to perform iADLs (finances etc). She reports he has been compliant on xarelto since last hospitalization in July and reportedly last took it yesterday. No prior history of seizures. She reports recent heavy alcohol use but no drug use otherwise.     NIH 20 remarkable for R HH (old), R FD, RUE/RLE hemiplegia, mute/severe dysarthria + partial neglect.     Of note - he was recently hospitalized 7/22/2024-7/23/2024 for a R PCA stroke due to xarelto non-adherence. He was counseled on xarelto adherence and stroke/cardiology follow up. He has previously been on warfarin but was switched to xarelto in the past 10 years - both prior strokes have been in setting of xarelto non-adherence.     Had stroke symptoms resolved at time of presentation: No    Past Medical History  Past Medical History:   Diagnosis Date    Cardiomegaly 03/11/2015    Cardiomegaly    Essential (primary) hypertension 06/18/2015    Hypertension    Other pulmonary embolism without acute cor pulmonale (Multi) 06/18/2015    Pulmonary embolism     Surgical History  Past Surgical History:   Procedure Laterality Date    CT ABDOMEN  PELVIS ANGIOGRAM W AND/OR WO IV CONTRAST  12/6/2016    CT ABDOMEN PELVIS ANGIOGRAM W AND/OR WO IV CONTRAST 12/6/2016 [facility] CLINICAL LEGACY    MR HEAD ANGIO WO IV CONTRAST  4/30/2017    MR HEAD ANGIO WO IV CONTRAST 4/30/2017 [facility] CLINICAL LEGACY    MR HEAD ANGIO WO IV CONTRAST  12/6/2016    MR HEAD ANGIO WO IV CONTRAST 12/6/2016 [facility] CLINICAL LEGACY    MR NECK ANGIO WO IV CONTRAST  4/30/2017    MR NECK ANGIO WO IV CONTRAST 4/30/2017 [facility] CLINICAL LEGACY    MR NECK ANGIO WO IV CONTRAST  12/6/2016    MR NECK ANGIO WO IV CONTRAST 12/6/2016 [facility] CLINICAL LEGACY     Social History  Social History     Tobacco Use    Smoking status: Former     Types: Cigarettes   Alcohol: recent heavy use  Drug use: marijuana use previously    Allergies  Patient has no known allergies.  Home Medications  Current Outpatient Medications   Medication Instructions    albuterol 90 mcg/actuation inhaler 2 puffs, inhalation, Every 4 hours PRN    amLODIPine (NORVASC) 10 mg, oral, Daily    aspirin 81 mg chewable tablet 1 tablet, oral, Daily (0630)    atorvastatin (LIPITOR) 40 mg, oral, Daily    Breo Ellipta 200-25 mcg/dose inhaler 1 puff, inhalation, Daily    carvedilol (Coreg) 3.125 mg tablet 1 tablet, oral, 2 times daily    gabapentin (NEURONTIN) 300 mg, oral, 3 times daily    lisinopril 40 mg, oral, Daily    PARoxetine (PAXIL) 20 mg, oral, Daily    Xarelto 20 mg, oral, Daily with evening meal       Physical Exam  HENT:      Head: Normocephalic and atraumatic.   Eyes:      Pupils: Pupils are equal, round, and reactive to light.   Neurological:      Mental Status: He is alert.       Neurological Exam:     MENTAL STATE:   Alert. Mute with no comprehensible sounds. Intermittently following simple commands.     CRANIAL NERVES:   CN 2   R HH (known) on BTT testing   CN 3, 4, 6   Pupils round, 4 mm in diameter, equally reactive to light. Lids symmetric; no ptosis. EOMs normal alignment with normal pursuit.     CN 7   Right facial droop  noted   CN 12    Tongue midline, with normal bulk and strength; no fasciculations.     MOTOR:   Muscle bulk and tone were normal in both upper and lower extremities.   No fasciculations, tremor or other abnormal movements were present.                         R          L  Deltoids       0          5  Biceps          0          5  Triceps          0          5                0           5    Hip Flex         0         5  Knee Flex      0          5  Knee Ext        0          5  Dorsiflex         0         5  Plantarflex      0         5    REFLEXES:                       R          L  BR:               2         2  Biceps:         2          2  Knee:            2          2  Ankle:          2          2    SENSORY:   In both upper and lower extremities, sensation was intact to nox stim     COORDINATION:  unable to test 2/2 pt cooperation     GAIT: deferred     NIHSS on Arrival:  1a  Level of consciousness: 0=alert; keenly responsive   1b. LOC questions:  2=Performs neither task correctly   1c. LOC commands: 1=Performs one task correctly   2.  Best Gaze: 0=normal   3. Visual: 1=Partial hemianopia   4. Facial Palsy: 2=Partial paralysis (total or near total paralysis of the lower face)   5a. Motor Left Arm: 0=No drift, limb holds 90 (or 45) degrees for full 10 seconds   5b.  Motor Right Arm: 4=No movement   6a. Motor Left Le=No drift, limb holds 90 (or 45) degrees for full 10 seconds   6b  Motor Right Le=No movement   7. Limb Ataxia: 0=Absent   8.  Sensory: 0=Normal; no sensory loss   9. Best Language:  3=Mute, global aphasia; no usable speech or auditory comprehension   10. Dysarthria: 2=Severe; patient speech is so slurred as to be unintelligible in the absence of or our of proportion to any dysphagia, or is mute/anarthric   11. Extinction and Inattention: 1=Visual, tactile, auditory, spatial or personal inattention or extinction to bilateral simultaneous stimulation in one of the sensory modalities          Total:    20     Last Recorded Vitals  Blood pressure (!) 168/115, pulse (!) 102, temperature 36.9 °C (98.5 °F), resp. rate 19, SpO2 97%.          Relevant Results        NIH Stroke Scale  1A. Level of Consciousness: Alert, Keenly Responsive  1B. Ask Month and Age: No Questions Right  1C. Blink Eyes & Squeeze Hands: Performs 1 Task  2. Best Gaze: Normal  3. Visual: No Visual Loss  4. Facial Palsy: Minor Paralysis  5A. Motor - Left Arm: No Drift  5B. Motor - Right Arm: No Movement  6A. Motor - Left Leg: No Drift  6B. Motor - Right Leg: No Movement  7. Limb Ataxia: Present in Two Limbs  8. Sensory Loss: Mild-to-Moderate Sensory Loss  9. Best Language: Mute, Global Aphasia  10. Dysarthria: Severe Dysarthria  11. Extinction and Inattention: No Abnormality  NIH Stroke Scale: 20    MRI head results: MR brain wo IV contrast    Result Date: 7/23/2024  MRI BRAIN: 1. Acute cortical infarct in the medial-most right occipital lobe involving PCA territory. 2. Chronic left PCA territory infarct involving the occipital lobe, parahippocampal gyrus, and dorsal medial left thalamus. 3. Moderate burden of supratentorial and infratentorial chronic small vessel ischemic changes including left cerebellar infarct, central pontine  infarct, supratentorial white matter disease.   MRA HEAD AND NECK: 1. No evidence of major vessel intracranial or extracranial occlusion. 2. Moderate narrowing of the intracranial vertebral artery which is slightly progressed from 05/21/2016. 3. Chronically occluded left PCA in keeping with remote left PCA territory infarct.   MACRO: Denita Cox discussed the significance and urgency of this critical finding by telephone with  Dr Lopez on 7/23/2024 at 5:05 a.m.. (**-RCF-**) Findings:  See findings.   Signed by: Yefri Thomas 7/23/2024 8:29 AM Dictation workstation:   [id number]   CT head results: CT brain attack head wo IV contrast    Result Date: 12/16/2024  CT HEAD: 1. Loss of gray-white  differentiation within the medial left frontal lobe consistent with acute to subacute infarction in the distribution of the left anterior cerebral artery . Mild effacement of the adjacent sulci. No evidence of acute intracranial hemorrhage. Consider further evaluation with dedicated MRI. 2. Stable encephalomalacia and gliosis as described above.     CT CERVICAL SPINE: 1. No acute fracture or traumatic malalignment of the cervical spine. 2. Mild multilevel degenerative changes.   I personally reviewed the images/study and resident's interpretation and I agree with the findings as stated by Sandy Coker MD (resident radiologist). This study was analyzed and interpreted at [place], [place].   MACRO: Juana Flaherty discussed the significance and urgency of this critical finding at the CT scanner with  Sharona Silvestre MD on 12/16/2024 at 7:30 PM.  (**-RCF-**) Findings:  See findings.   Signed by: López Arlelano 12/16/2024 8:27 PM Dictation workstation:   [id number]    CT brain attack head wo IV contrast    Result Date: 7/22/2024  No acute intracranial hemorrhage, mass effect or midline shift. Similar-appearing old infarcts and scattered white matter hypodensities which may represent sequela of small vessel ischemia. Consider further evaluation with MRI if there is persistent clinical concern for acute ischemia.   I personally reviewed the images/study and I agree with the findings as stated by Kvng Birch MD. This study was interpreted at [place], [place].   MACRO: None.   Signed by: Evan Finkelstein 7/22/2024 10:38 PM Dictation workstation:   [id number]   CT brain angio imaging results: [unfilled]            BNP   Date/Time Value Ref Range Status   07/23/2024 05:51  (H) 0 - 99 pg/mL Final         Stroke Alert CT/MRI review: Was interpreted as it was being performed     IV Thrombolysis  IV Thrombolysis Given:  No; Thrombolysis contraindication reason: Time from Last Known Well (or stroke onset) is >4.5 hours , Patient receiving direct thrombin inhibitors or direct Factor Xa inhibitors  , and CT  with an obvious hypodensity represents irreversible injury    Discussed the diagnosis of suspected ischemic stroke and options for treatment, including alternative treatments. For patients meeting inclusion and exclusion criteria, the expected benefits exceed the risks of IV thrombolysis therapy or withholding therapy. The main risk of IV thrombolysis therapy is bleeding into the brain or body that may require blood transfusions or lead to death. In clinical studies, the rate of death was not higher in patients who received IV thrombolysis compared to those who did not. Other risks include allergic reactions, and with any procedure there is always the possibility of an unexpected complication.  Patient is <18 - refer [facility] of pediatric patients with Acute suspected Stroke & the Pediatric IV Thrombolysis Consent. Consent is completed on a paper document and if criteria is met/ benefit outweigh the risk the thrombolytic Alteplase should be given.  :99}       Assessment/Plan   Assessment & Plan  Stroke due to embolism of left anterior cerebral artery (Multi)        Michael Reed is a 54 y.o. right handed male with history of L PCA stroke 2016 (w/ R HH and right hemiplegia), R PCA stroke 7/2024 both in setting of non-adherent to xarelto, Factor V Leiden (non-adherence to xarelto), HTN, HLD presenting as a BAT after being found down. On exam, he has dense right hemiplegia (worse from prior), severe expressive aphasia. CTH/CTA with L CAREN infarct and L A2 cut off - not a candidate for thrombolysis as out of the window with established hypodensity and on DOAC. His degree of aphasia doesn't fit with the L CAREN infarct - given that he was found down for unclear duration - will obtain vEEG to rule out any seizure  activity.     Additionally, since ex-wife reports he has been adherent to xarelto (albeit a long hx of non-adherence) - given a new stroke while on xarelto presuming adherence, would need to discuss choice of AC and consider switching.     Stroke Classification:  Type: Ischemic stroke  Subtype/etiology: undetermined - likely embolic   Vessels involved: L CAREN   Neurological manifestations:  Pre-Intervention Deficits: NIHSS 20 (with breakdown)  Pre-stroke mRS: 3  Initial treatment: None  Anti-platelets or Anti-coagulation management: Aspirin  Vascular Risk Factors: Factor V Leiden, HTN/HLD   Antiplatelet/antithrombotic plan for stroke prevention: ASA   VTE prophylaxis: lovenox     #L CAREN infarct   #?seizure   - Stroke work up:        - Utox        - LDL, A1c, BNP        - TTE w/ bubble study          - Consider utility of MRI w/o contrast in AM   - Consider vEEG given aphasia on exam to rule out seizure   - Continue home ASA 81 mg daily - hold off on AC for now given stroke burden.   - Discuss choice of AC in AM given stroke on xarelto with reported adherence.   - Continue home atorvastatin 40 mg daily   - SLP/PT/OT consult   - NPO until bedside swallow - will likely need enteral nutrition long term     #Factor V Leiden   - Hold home xarelto. Hold off on AC for now given stroke burden - will discuss choice of AC in AM.     #Recent heavy alcohol use   - unclear last drink   - [facility] protocol   - Thiamine 500 mg IV TID x3 days --> 100 mg daily PO    #HTN  #HLD   - Hold home antihypertensives - amlodipine 10 mg daily, Coreg 3.125 mg BID, Lisinopril 40 mg daily - to allow for permissive HTN sBP < 220 - PRN hydral, labetalol  - Continue atorvastatin 40 mg daily     Vascular Risk Factor modification goals:  Blood pressure goals: avoid hypotension SBP <100 that could worsen cerebral perfusion, Ischemic stroke- early permissive hypertension SBP < 220 mmHg with cautious inpatient lowering  Lipid Goals: education on healthy diet  and statin therapy to maintain or achieve goal LDL-cholesterol < 70mg  Glucose Goals: early treatment of hyperglycemia to goal glucose 140-180 mg/dl with long-term goal A1c < 7%   Smoking Cessation and Education  Assessment for Rehabilitation needs   Patient and family education on signs and symptoms of stroke, calling 911, healthy strategies for stroke prevention.      F: PRN (caution - EF 30-35%)  E: PRN, K > 4, Mg > 2, Phos >3   N: NPO until bedside swallow   A: PIV    DVT ppx: lovenox    GI ppx: not indicated       Code status: DNR ok for intubation (confirmed on admission with ex-wife)  NOK: Tiffany Reed (ex-wife - not , POA) - [phone]     Patient seen, discussed and examined with senior resident. To be formally staffed with attending in AM. Recommendations not final until note is signed by attending.     Sharona Silvestre MD   Neurology, PGY2  Stroke [id number]

## 2024-12-17 NOTE — PROGRESS NOTES
Inpatient SLP Speech-Language Cognition Evaluation     Patient Name: Michael Reed  MRN: [id number]  Today's Date: 12/17/2024   Time Calculation  Start Time: 1119  Stop Time: 1158  Time Calculation (min): 39 min       SLP Assessment:   Pt presenting with mixed expressive and receptive aphasia, expressive deficits considered severe however unable to fully classify receptive deficits in setting of severity of expressive impairment at this time. Pt with limited command following, improved w/ model/cues and limited initiation for yes/no type responses. Currently no reliable means of communicating wants/needs/thoughts, noting only occasional groans/voicing and minimal use of gestures. Plan to target these deficits in therapy in order to establish a means of communication and determine plan of care.       SLP Plan:  Plan  SLP TX Plan: Continue Plan of Care  SLP Plan: Skilled SLP  SLP Frequency: 3x per week  Duration: 1 month  SLP Discharge Recommendations:  (Ongoing SLP targeting communication, no further SLP for swallow at discharge)  Discussed POC: Patient, Nursing, Physician  SLP - OK to Discharge: Yes    Goals:   Pt will follow basic/one-step commands with max cues for accurate completion >80% opportunities.   Start: 12/17/24, End: 01/17/25  Pt will communicate wants/needs/thoughts during sessions and with nursing/medical staff via any modality given max cues.   Start: 12/17/24, End: 01/17/25      Subjective   Current Problem  Michael Reed is a 54 y.o. right handed male with history of L PCA stroke 2016 (w/ R HH and right hemiplegia), R PCA stroke 7/2024 both in setting of non-adherent to xarelto, Factor V Leiden (non-adherence to xarelto), HTN, HLD presenting as a BAT after being found down. On exam, he has dense right hemiplegia (worse from prior), severe expressive aphasia. CTH/CTA with L CAREN infarct and L A2 cut off - not a candidate for thrombolysis as out of the window with established hypodensity and on DOAC.  His degree of aphasia doesn't fit with the L CAREN infarct - given that he was found down for unclear duration - will obtain vEEG to rule out any seizure activity.      Additionally, since ex-wife reports he has been adherent to xarelto (albeit a long hx of non-adherence) - given a new stroke while on xarelto presuming adherence, would need to discuss choice of AC and consider switching.      Current presentation:   Pt received upright and alert, he repositioned self to edge of bed. Willing to participate. Breathing on room air. Pt's family present, they denied hx communication disorder.       Objective     Cognition:  Cognition  Overall Cognitive Status: Impaired  Orientation Level: Unable to assess        Motor Speech Production:  Motor Speech Production  Automatic Speech: Impaired (noted groan/voicing when cued for counting to 5 w/ max cues)  Repetition: Words (Impaired)      Auditory Comprehension:   Auditory Comprehension  Yes/No Questions: Impaired  Basic Questions: Minimal initiation for yes/no type responses across session, noted occasional spontaneous yes/no responses via head nod/shake  Commands: Impaired  One Step Basic Commands: Impaired (Given model and extra time pt follows commands 75% accuracy. Suspect oral apraxia impacting ability to follow commands related to oral movements)    Verbal:  Verbal Expression  Confrontation Naming: Impaired (no response)  Repetition: Impaired  Conversation: Impaired  Affect: Impaired     Inpatient Education:  Pt/family educated on result and recommendations. Pt did not indicate understanding but family did.

## 2024-12-17 NOTE — PROGRESS NOTES
Adult SLP Clinical Swallow Evaluation    Patient Name: Michael Reed  MRN: [id number]  Today's Date: 12/17/2024   Time Calculation  Start Time: 1119  Stop Time: 1158  Time Calculation (min): 39 min       Recommendations:  Solid Diet Recommendations : Regular (IDDSI Level 7)  Liquid Diet Recommendations: Thin (IDDSI Level 0)  Compensatory Swallowing Strategies: Full supervision with meals, Eat/feed slowly, Small bites/sips  Frequent oral care    Assessment:  Clinical swallow evaluation completed revealing functional oral phase and no s/s aspiration or pharyngeal dysphagia. Pt is impulsive however, and a little drowsy thus recommend supervision to ensure appropriate alertness and rate of consumption. Recommend regular texture diet. SLP to follow-up w/ PO intake x1 to ensure modifications are not necessary otherwise will solely target communication (See eval for details).     Plan:  SLP Plan: Skilled SLP  SLP Frequency: 3x per week  Duration: 2 weeks  SLP Discharge Recommendations:  (Ongoing SLP targeting communication, no further SLP for swallow at discharge)  Discussed POC: Patient, Nursing, Physician  SLP - OK to Discharge: Yes      Goals:   Pt will tolerate least restrictive diet without s/s aspiration, respiratory compromise, or overt difficulty throughout admission.  Start: 12/17/24, End: 12/24/24  Status: New goal         Subjective     History and Physical:    Michael Reed is a 54 y.o. right handed male with history of L PCA stroke 2016 (w/ R HH and right hemiplegia), R PCA stroke 7/2024 both in setting of non-adherent to xarelto, Factor V Leiden (non-adherence to xarelto), HTN, HLD presenting as a BAT after being found down. On exam, he has dense right hemiplegia (worse from prior), severe expressive aphasia. CTH/CTA with L CAREN infarct and L A2 cut off - not a candidate for thrombolysis as out of the window with established hypodensity and on DOAC. His degree of aphasia doesn't fit with the L CAREN infarct -  given that he was found down for unclear duration - will obtain vEEG to rule out any seizure activity.      Additionally, since ex-wife reports he has been adherent to xarelto (albeit a long hx of non-adherence) - given a new stroke while on xarelto presuming adherence, would need to discuss choice of AC and consider switching.      Relevant SLP Hx:  SLP jordan in 2016 w/ recs for regular solids/thin liquids.      Subjective Presentation:  Pt received upright and alert, he repositioned self to edge of bed. Willing to participate. Breathing on room air. Pt's family present, they denied hx of dysphagia.     Objective     Oral/Motor Assessment:  Oral Hygiene: WFL  Dentition: Adequate/Natural  Oral Motor: Impaired Function  Facial Symmetry: Right droop  Labial ROM: Reduced right  Labial Symmetry: Abnormal symmetry right  Lingual ROM: Within Functional Limits  Vocal Quality:  (Minimal vocalizations appreciated)  Intelligibility:  (minimally verbal 2/2 aphasia)      Clinical Observations:  Textures Trialed: Thin Liquid , Puree, and Regular Solid  3oz Water Protocol Utilized: yes, pass    Pt consumed trials of ice chips, tsps water, cup sips water and straw sips of water. Noted significant anterior spillage of liquids when administered via cup, eliminated w/ straw sip. Proceed to 3oz water assessment via straw sips which pt successfully completed without s/s aspiration, change in vitals, and maintaining clear vocal quality.  Trials then presented of puree and regular solids. Pt self-fed trials w/ large bite size despite cues. Noted efficient mastication. Little-no oral residues observed following completion of trials. No s/s aspiration observed across all trials.       Inpatient Education:  Pt/family educated on result and recommendations. Pt did not indicate understanding but family did.

## 2024-12-17 NOTE — SIGNIFICANT EVENT
"Preliminary EEG Report     This vEEG is consistent with a left hemispheric epileptogenic structural lesion. No seizures seen.     This EEG was read from 2320 to 2342 on 12/17/24 .     The final impression will be available tomorrow under Chart Review in the Media tab. If the plan is to discontinue the video EEG, please place a \"Discontinue Continuous VEEG\" order in the EMR; otherwise the EEG tech will not receive the notification.      Jason Van DO  Adult Epilepsy Fellow  [facility] [place]     "

## 2024-12-17 NOTE — ED PROVIDER NOTES
Emergency Department Provider Note        History of Present Illness     History provided by: EMS  Limitations to History: Dysarthria  External Records Reviewed with Brief Summary:  Reviewed discharge summary from 7/23/2024 in which patient was admitted for an ischemic stroke believed secondary to poor adherence to Xarelto, at that time patient was noted to have residual right upper extremity weakness from prior stroke but able to sustain antigravity and make a fist.  He had no dysarthria or aphasia noted.    HPI:  Michael Reed is a 54-year-old male with past medical history of factor V Leiden (on rivaroxaban) prior PCA stroke with residual right hemiplegia, hemisensory loss, hemianopia presenting as a BAT activation. Patient was found on his front porch, unclothed, a neighbor saw the patient and called 911.  Unclear how long the patient had been outside, unclear events leading to patient getting outside, was brought to the ED for evaluation.  Patient appears to be understanding and is intermittently nodding yes to questioning but does have some right sided weakness however has a prior stroke with baseline weakness, unclear if advanced today.  Patient is aphasic and does not able to voice responses to questioning.    Spoke with patient's son and wife for collateral, he was last seen normal around 1-1/2 to 2 days ago, they note some baseline right sided weakness but no difficulty with speech.    Last Known Well Time: unknown    Physical Exam   Triage vitals:  T 36.9 °C (98.5 °F)  HR (!) 115  BP (!) 168/123  RR 19  O2 96 % None (Room air)    General: Awake, alert, aphasic  Eyes: Gaze conjugate.  No scleral icterus or injection  HENT: Normo-cephalic, atraumatic. No stridor.  CV: Tachycardic rate, regular rhythm. Radial pulses 2+ bilaterally  Resp: Breathing non-labored. Clear to auscultation bilaterally  GI: Soft, non-distended, non-tender. No rebound or guarding.  MSK/Extremities: No gross bony deformities.    Skin: Warm. Appropriate color  Neuro: see below for NIHSS  Psych: Appropriate mood and affect    NIH Stroke Scale  Person Administering Scale: Abigail Figueroa     1a  Level of consciousness: 0=alert; keenly responsive   1b. LOC questions:  2=Performs neither task correctly   1c. LOC commands: 1=Performs one task correctly   2.  Best Gaze: 0=normal   3. Visual: 1=Partial hemianopia   4. Facial Palsy: 2=Partial paralysis (total or near total paralysis of the lower face)   5a. Motor left arm: 0=No drift, limb holds 90 (or 45) degrees for full 10 seconds   5b.  Motor right arm: 3=No effort against gravity, limb falls   6a. Motor left le=No drift, limb holds 90 (or 45) degrees for full 10 seconds   6b  Motor right leg:  3=No effort against gravity, limb falls   7. Limb Ataxia: 0=Absent   8.  Sensory: 0=Normal; no sensory loss   9. Best Language:  3=Mute, global aphasia; no usable speech or auditory comprehension   10. Dysarthria: 2=Severe; patient speech is so slurred as to be unintelligible in the absence of or our of proportion to any dysphagia, or is mute/anarthric   11. Extinction and Inattention: 1=Visual, tactile, auditory, spatial or personal inattention or extinction to bilateral simultaneous stimulation in one of the sensory modalities     Total:   18     VAN: Positive    Medical Decision Making & ED Course   Medical Decision Makin y.o. male history of prior CVA presenting to the ED as a critical patient after being found down outside unclothed, has history of prior stroke with right-sided deficits, no reported history of aphasia, on ED arrival patient was noted to have right-sided deficits, facial droop and complete aphasia, did appear to comprehend questioning and was protecting airway however concern for acute stroke, bat was activated, patient was taken for emergent CT head and CT angio which confirmedA large acute versus subacute infarction in the distribution of the left CAREN new compared to  prior imaging, CT angio showed cutoff at the distal A2/proximal A3 segment of the left CAREN consistent with stroke.  Findings consistent with patient's presentation, due to large area of stroke patient was loaded with Keppra for seizure prophylaxis, basic labs obtained showing basic renal dysfunction, no significant electrolyte abnormalities, spoke with neurology, no acute interventions, patient is not a candidate for TNK due to anticoagulation and unknown last known normal, is not a thrombectomy candidate at this time.  Neuro did feel that degree of aphasia is inconsistent with area of infarct therefore recommended video EEG to rule out underlying seizure activity, given acute stroke patient was admitted to the stroke service for further management, at this time is protecting airway and is stable for floor with telemetry.  Admitted pending additional workup.  ----      Social Determinants of Health which Significantly Impact Care: None identified     EKG Independent Interpretation: EKG interpreted by myself. Please see ED Course for full interpretation.    Independent Result Review and Interpretation: Relevant laboratory and radiographic results were reviewed and independently interpreted by myself.  As necessary, they are commented on in the ED Course.    Chronic conditions affecting the patient's care: As documented above in [facility]    The patient was discussed with the following consultants/services:  Neurology    Care Considerations: As documented above in [facility]    IV Thrombolysis IV Thrombolysis Checklist        IV Thrombolysis Given: No; Thrombolysis contraindication reason: Time from Last Known Well (or stroke onset) is >4.5 hours  and Patient receiving direct thrombin inhibitors or direct Factor Xa inhibitors        ED Course:  ED Course as of 12/22/24 1519   Mon Dec 16, 2024   1950 ECG showing Sinus tachycardia with PVC, T wave inversions with early ST depressions in inferior lateral leads, similar to previous  ECG from July 2024.  , , QTc 489 [KR]   1954 SpokeSpoke with patient's previous wife Tiffany Reed, states they are no longer  but she does services POA, she was updated with patient presentation, additionally spoke to patient's son, no one has spoken to the patient in the last 1-1/2 to 2 days, still unclear as to when his last known normal as, pharmacy was able to confirm with patient that he is questionably compliant on his blood thinners however given lack of ability to confirm patient's last known normal he is not a candidate for thrombolysis [KR]      ED Course User Index  [KR] Abigail Figueroa DO         Diagnoses as of 12/22/24 1519   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  The patient was informed of his diagnosis.      Procedures   Procedures    Patient seen and discussed with ED attending physician.    Abigail Figueroa DO  Emergency Medicine     Abigail Figueroa DO  Resident  12/17/24 1527       Abigail Figueroa DO  Resident  12/17/24 1527    Emergency Medicine Attending Attestation:     ED Course as of 12/22/24 1519   Mon Dec 16, 2024   1950 ECG showing Sinus tachycardia with PVC, T wave inversions with early ST depressions in inferior lateral leads, similar to previous ECG from July 2024.  , , QTc 489 [KR]   1954 SpokeSpoke with patient's previous wife Tiffany Reed, states they are no longer  but she does services POA, she was updated with patient presentation, additionally spoke to patient's son, no one has spoken to the patient in the last 1-1/2 to 2 days, still unclear as to when his last known normal as, pharmacy was able to confirm with patient that he is questionably compliant on his blood thinners however given lack of ability to confirm patient's last known normal he is not a candidate  for thrombolysis [KR]      ED Course User Index  [KR] Abigail Figueroa DO         Diagnoses as of 12/22/24 1519   Stroke due to embolism of left anterior cerebral artery (Multi)   Hypothermia, initial encounter   Expressive aphasia       The patient was seen by the resident/fellow.  I have personally performed a substantive portion of the encounter.  I have seen and examined the patient; agree with the workup, evaluation, MDM, management and diagnosis.  The care plan has been discussed with the resident; I have reviewed the resident’s note and agree with the documented findings.        Patient arrives to the ED with EMS - neighbors called 911 as he was found on his porch without clothes  Eyes open but unable to follow comands  Withdrawing to pain but appears to have L sided facial droop  and expressive aphasia    BAT activated  Maintaining airway and ETCO2 applied    As above contacted family and last known well is at least 1-2 days  CT scan showed large new territory infarct    Will need admission but patient is not a candidate for intervention given above   Warming protocols initiated with good effect     I independently interpreted patient's EKG and agree with the above mentioned interpretation.        MD Brie Diallo MD  12/22/24 1518

## 2024-12-17 NOTE — PROGRESS NOTES
Physical Therapy    Physical Therapy Evaluation    Patient Name: Michael Reed  MRN: [id number]  Today's Date: 12/17/2024   Time Calculation  Start Time: 0930  Stop Time: 0950  Time Calculation (min): 20 min    Assessment/Plan   PT Assessment  PT Assessment Results: Decreased strength, Decreased mobility, Impaired balance, Decreased cognition  Rehab Prognosis: Good  Barriers to Discharge Home: Physical needs, Cognition needs  Cognition Needs: Cognition-related high falls risk  Physical Needs: High falls risk due to function or environment  End of Session Communication: Bedside nurse  Assessment Comment: Pt presents with decreased strength, balance, cognition, and mobility. Pt would benefit from PT to address the above deficits.  End of Session Patient Position: Bed, 4 rail up, Alarm on  IP OR SWING BED PT PLAN  Inpatient or Swing Bed: Inpatient  PT Plan  Treatment/Interventions: Bed mobility, Transfer training, Balance training, Strengthening, Therapeutic exercise, Neuromuscular re-education  PT Plan: Ongoing PT  PT Frequency: 4 times per week  PT Discharge Recommendations: High intensity level of continued care  PT Recommended Transfer Status: Assist x2  PT - OK to Discharge: Yes      Subjective   General Visit Information:  Reason for Referral: presenting as a BAT after being found down.  Past Medical History Relevant to Rehab: L PCA stroke 2016 (w/ R HH and right hemiplegia), R PCA stroke 7/2024 both in setting of non-adherent to xarelto, Factor V Leiden (non-adherence to xarelto), HTN, HLD  Co-Treatment: OT  Co-Treatment Reason: to maximize mobility and safety  Prior to Session Communication: Bedside nurse  Patient Position Received: Bed, 4 rail up, Alarm on   Home Living:  Home Living  Home Living Comments: unknown  Prior Level of Function:  Prior Function Per Pt/Caregiver Report  Prior Function Comments: unknown  Precautions:  Precautions  Medical Precautions: Fall precautions       Objective     Pain:  Pain  Assessment  Pain Assessment: 0-10  0-10 (Numeric) Pain Score:  (grimaces with mobility, but unable to localize)  Cognition:  Cognition  Overall Cognitive Status: Impaired (non-verbal, expressive and receptive deficits)  Orientation Level: Unable to assess  Following Commands:  (followed <10% of one step commands)      Extremity/Trunk Assessments:  Strength:           RLE   RLE : Exceptions to WFL  Strength RLE  R Hip Flexion: 0/5  R Hip ABduction: 0/5  R Hip ADduction: 0/5  R Knee Flexion: 0/5  R Knee Extension: 0/5  R Ankle Dorsiflexion: 0/5  R Ankle Plantar Flexion: 0/5  LLE   LLE : Exceptions to WFL  Strength LLE  LLE Overall Strength: Greater than or equal to 3/5 as evidenced by functional mobility    General Assessments:            Sensation  Light Touch: Not tested     Static Sitting Balance  Static Sitting-Level of Assistance: Contact guard  Dynamic Sitting Balance  Dynamic Sitting-Level of Assistance: Minimum assistance       Functional Assessments:  Bed Mobility  Bed Mobility: Yes  Bed Mobility 1  Bed Mobility 1: Supine to sitting, Sitting to supine  Level of Assistance 1: Maximum assistance, +2  Bed Mobility Comments 1: HOB elevated  Bed Mobility 2  Bed Mobility  2: Scooting  Level of Assistance 2: Dependent, +2  Bed Mobility Comments 2: use of draw sheet  Transfers  Transfer:  (NT due to weakness and poor command following)             Outcome Measures:  [facility] Basic Mobility  Turning from your back to your side while in a flat bed without using bedrails: A lot  Moving from lying on your back to sitting on the side of a flat bed without using bedrails: A lot  Moving to and from bed to chair (including a wheelchair): Total  Standing up from a chair using your arms (e.g. wheelchair or bedside chair): Total  To walk in hospital room: Total  Climbing 3-5 steps with railing: Total  Basic Mobility - Total Score: 8                               Encounter Problems       Encounter Problems (Active)       Balance        supervision static/dynamic sitting        Start:  12/17/24    Expected End:  01/07/25               Mobility       Pt will actively participate in BLE ther-ex in order to improve BLE strength and to assist with the completion of functional mobility tasks.        Start:  12/17/24    Expected End:  01/07/25               PT Transfers       STG - Transfer from bed to chair with LRD with mod assist x2       Start:  12/17/24    Expected End:  01/07/25            STG - Patient will perform bed mobility with mod assist       Start:  12/17/24    Expected End:  01/07/25            STG - Patient will transfer sit to and from stand with LRD with mod assist x2       Start:  12/17/24    Expected End:  01/07/25                   Education Documentation  Mobility Training, taught by Brittany Arzate, PT at 12/17/2024 10:43 AM.  Learner: Patient  Readiness: Acceptance  Method: Explanation  Response: Needs Reinforcement, No Evidence of Learning  Comment: safe mobility, purpose of PT    Education Comments  No comments found.            12/17/24 at 10:44 AM   Brittany Arzate, PT

## 2024-12-17 NOTE — PROGRESS NOTES
Occupational Therapy    Evaluation    Patient Name: Michael Reed  MRN: [id number]  Today's Date: 12/17/2024  Time Calculation  Start Time: 0930  Stop Time: 0950  Time Calculation (min): 20 min    Assessment  IP OT Assessment  OT Assessment: Pt required max assist x 2 with bed mobility, able to sit at the side of the bed with CGA static sitting balance. Pt presents with increased R UE tone, R proximal to distal UE no active movement noted.  Prognosis: Good  Barriers to Discharge Home: Caregiver assistance  Evaluation/Treatment Tolerance: Patient tolerated treatment well  Medical Staff Made Aware: Yes  End of Session Communication: Bedside nurse  End of Session Patient Position: Alarm on, Bed, 3 rail up  Plan:  Treatment Interventions: ADL retraining, UE strengthening/ROM, Endurance training, Cognitive reorientation, Neuromuscular reeducation, Fine motor coordination activities  OT Frequency: 4 times per week  OT Discharge Recommendations: High intensity level of continued care  OT Recommended Transfer Status: Maximum assist, Assist of 2  OT - OK to Discharge: Yes    Subjective   Current Problem:  1. Stroke due to embolism of left anterior cerebral artery (Multi)  EEG    Transthoracic Echo (TTE) Complete    Transthoracic Echo (TTE) Complete        General:  Reason for Referral: presenting as a BAT after being found down.  Past Medical History Relevant to Rehab: L PCA stroke 2016 (w/ R HH and right hemiplegia), R PCA stroke 7/2024 both in setting of non-adherent to xarelto, Factor V Leiden (non-adherence to xarelto), HTN, HLD  Co-Treatment: PT  Co-Treatment Reason: to maximize mobility and safety  Prior to Session Communication: Bedside nurse  Patient Position Received: Bed, 4 rail up, Alarm on   Precautions:  Medical Precautions: Fall precautions    Pain:  Pain Assessment  Pain Assessment: 0-10  0-10 (Numeric) Pain Score:  (facial grimaces with LE movement, pt was not able to report pain this date.)        Objective    Cognition:  Overall Cognitive Status: Impaired  Orientation Level: Unable to assess  Following Commands:  (followed ~ 10 % simple cues with demonstrations and tactile cues.)  Safety/Judgement: Exceptions to WFL           Home Living:  Home Living Comments: Questionable   Prior Function:  Level of [place]: Unable to asses at this time       ADL:  Grooming Assistance: Minimal (mod step by step verbal cues.)  Grooming Deficit: Wash/dry face  Bathing Assistance: Total  Bathing Deficit: Right arm  LE Dressing Assistance: Total  LE Dressing Deficit: Don/doff R sock, Don/doff L sock  Activity Tolerance:  Endurance: Endurance does not limit participation in activity  Balance:  Static Standing Balance  Static Standing-Balance Support: Left upper extremity supported  Static Standing-Level of Assistance: Contact guard  Bed Mobility/Transfers: Bed Mobility  Bed Mobility: Yes  Bed Mobility 1  Bed Mobility 1: Supine to sitting, Sitting to supine  Level of Assistance 1: Maximum assistance, +2  Bed Mobility Comments 1: HOB elevated  Bed Mobility 2  Bed Mobility  2: Scooting  Level of Assistance 2: Dependent, +2   and Transfers  Transfer: No       Vision:     and Vision - Complex Assessment  Ocular Range of Motion: Within Functional Limits  Sensation:  Light Touch: Not tested       Perception:  Initiation: Cues to initiate tasks  Coordination:  Movements are Fluid and Coordinated: No (Imapried R UE coordination)   Hand Function:  Hand Function  Gross Grasp: Impaired (R UE)  Coordination: Impaired (R UE)  Extremities: RUE   RUE :  (R shoulder PROM ~ 90 degrees flex. R elbow to digits PROM WFL. Pt presented with increased tone R UE. R proximal to distal UE strength Trace.), LUE   LUE: Within Functional Limits,     Outcome Measures: [facility] Daily Activity  Putting on and taking off regular lower body clothing: Total  Bathing (including washing, rinsing, drying): Total  Putting on and taking off regular upper body clothing: A  lot  Toileting, which includes using toilet, bedpan or urinal: Total  Taking care of personal grooming such as brushing teeth: A lot  Eating Meals: Total  Daily Activity - Total Score: 8         ,     OT Adult Other Outcome Measures  4AT: Unable to complete    Education Documentation  Body Mechanics, taught by Ariel Barfield OT at 12/17/2024  3:29 PM.  Learner: Patient  Readiness: Acceptance  Method: Explanation  Response: Needs Reinforcement    Home Exercise Program, taught by Ariel Barfield OT at 12/17/2024  3:29 PM.  Learner: Patient  Readiness: Acceptance  Method: Explanation  Response: Needs Reinforcement    ADL Training, taught by Ariel Barfield OT at 12/17/2024  3:29 PM.  Learner: Patient  Readiness: Acceptance  Method: Explanation  Response: Needs Reinforcement    Education Comments  No comments found.        Goals:   Encounter Problems       Encounter Problems (Active)       ADLs       Patient will perform UB and LB bathing  with moderate assist level of assistance and grab bars. (Progressing)       Start:  12/17/24    Expected End:  01/07/25            Patient with complete upper body dressing with minimal assist  level of assistance donning and doffing all UE clothes with PRN adaptive equipment while edge of bed  (Progressing)       Start:  12/17/24    Expected End:  01/07/25            Patient with complete lower body dressing with minimal assist  level of assistance donning and doffing all LE clothes  with PRN adaptive equipment while edge of bed  (Progressing)       Start:  12/17/24    Expected End:  01/07/25            Patient will complete daily grooming tasks washing face/hair with stand by assist level of assistance and PRN adaptive equipment while edge of bed . (Progressing)       Start:  12/17/24    Expected End:  01/07/25            Patient will complete toileting including hygiene clothing management/hygiene with minimal assist  level of assistance and grab bars. (Progressing)       Start:   12/17/24    Expected End:  01/07/25               BALANCE       Pt will maintain dynamic standing balance during ADL task with moderate assist level of assistance in order to demonstrate decreased risk of falling and improved postural control. (Progressing)       Start:  12/17/24    Expected End:  01/07/25               COGNITION/SAFETY       Pt will follow One  steps verbal cues 50% of the time during OT tx session. (Progressing)       Start:  12/17/24    Expected End:  01/07/25               EXERCISE/STRENGTHENING       Patient will complete RUE exercises for 15 reps in order to improve strength and activity for ADL performance.  (Progressing)       Start:  12/17/24    Expected End:  01/07/25               MOBILITY       Patient will perform Functional mobility min Household distances/Community Distances with minimal assist  level of assistance and least restrictive device in order to improve safety and functional mobility. (Progressing)       Start:  12/17/24    Expected End:  01/07/25               TRANSFERS       Patient will perform bed mobility minimal assist  level of assistance and bed rails in order to improve safety and independence with mobility (Progressing)       Start:  12/17/24    Expected End:  01/07/25            Patient will complete sit to stand transfer with minimal assist  level of assistance and least restrictive device in order to improve safety and prepare for out of bed mobility. (Progressing)       Start:  12/17/24    Expected End:  01/07/25 12/17/24 at 3:29 PM   Ariel Barfield OTR/OTD  Rehab Office: [phone]
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
## 2024-12-17 NOTE — PROGRESS NOTES
ID: Michael Reed is a 54 y.o. male on hospital day 1 for acute cerebral infarction.  Subjective    NAEON. In to see patient this AM, no new/acute complaints.    Objective   Vitals 24hrs:  Heart Rate:  []   Temp:  [36.1 °C (97 °F)-36.9 °C (98.5 °F)]   Resp:  [4-21]   BP: (143-182)/()   Weight:  [116 kg (255 lb 1.2 oz)]   SpO2:  [94 %-98 %]      I/Os 2hr:  Intake/Output Summary (Last 24 hours) at 12/17/2024 0856  Last data filed at 12/17/2024 0733  Gross per 24 hour   Intake 0 ml   Output 0 ml   Net 0 ml     Physical Exam:  Alert. Mute with no comprehensible sounds. Intermittently following simple commands.   R HH (known) on BTT testing   Right facial droop  noted   Muscle bulk and tone were normal   R sided hemiplegia, LUE/LLE AG spontaneously w/o drift  SILT to nox stim    NIHSS  NIH Stroke Scale  1A. Level of Consciousness: Alert, Keenly Responsive  1B. Ask Month and Age: No Questions Right  1C. Blink Eyes & Squeeze Hands: Performs 1 Task  2. Best Gaze: Normal  3. Visual: No Visual Loss  4. Facial Palsy: Minor Paralysis  5A. Motor - Left Arm: No Drift  5B. Motor - Right Arm: No Movement  6A. Motor - Left Leg: No Drift  6B. Motor - Right Leg: No Movement  7. Limb Ataxia: Present in Two Limbs  8. Sensory Loss: Mild-to-Moderate Sensory Loss  9. Best Language: Mute, Global Aphasia  10. Dysarthria: Severe Dysarthria  11. Extinction and Inattention: Visual, Tactile, Auditory, Spatial, or Personal Inattention  NIH Stroke Scale: 21           Chung Coma Scale  Best Eye Response: Spontaneous  Best Verbal Response: Incomprehensible sounds  Best Motor Response: Follows commands  Chung Coma Scale Score: 12                  Medications  Scheduled:  atorvastatin, 40 mg, oral, Nightly  enoxaparin, 40 mg, subcutaneous, Daily  folic acid, 1 mg, oral, Daily  gabapentin, 300 mg, oral, q8h MARILU  multivitamin with minerals, 1 tablet, oral, Daily  PARoxetine, 20 mg, oral, Daily  polyethylene glycol, 17 g, oral,  Daily  [START ON 12/19/2024] thiamine, 100 mg, oral, Daily  thiamine, 500 mg, intravenous, q8h MARILU      PRN:  PRN medications: hydrALAZINE **FOLLOWED BY** [START ON 12/18/2024] hydrALAZINE, labetaloL, oxygen   Continuous:     Lab Results  Results from last 72 hours   Lab Units 12/16/24 1922   SODIUM mmol/L 144   POTASSIUM mmol/L 3.5   BUN mg/dL 19   CREATININE mg/dL 1.77*   CALCIUM mg/dL 9.7      Results from last 72 hours   Lab Units 12/16/24 2021   WBC AUTO x10*3/uL 10.0   HEMOGLOBIN g/dL 13.1*   HEMATOCRIT % 39.0*   PLATELETS AUTO x10*3/uL 236      Results from last 72 hours   Lab Units 12/16/24 1922   AST U/L 88*   ALT U/L 23   ALK PHOS U/L 94       Results from last 72 hours   Lab Units 12/16/24 2103   INR  1.1      Results from last 72 hours   Lab Units 12/16/24 2021 12/16/24 1922   HEMOGLOBIN A1C % 6.2*  --    LDL CALC mg/dL  --  110*        Imaging Results  MR brain wo IV contrast    Result Date: 7/23/2024  MRI BRAIN: 1. Acute cortical infarct in the medial-most right occipital lobe involving PCA territory. 2. Chronic left PCA territory infarct involving the occipital lobe, parahippocampal gyrus, and dorsal medial left thalamus. 3. Moderate burden of supratentorial and infratentorial chronic small vessel ischemic changes including left cerebellar infarct, central pontine  infarct, supratentorial white matter disease.   MRA HEAD AND NECK: 1. No evidence of major vessel intracranial or extracranial occlusion. 2. Moderate narrowing of the intracranial vertebral artery which is slightly progressed from 05/21/2016. 3. Chronically occluded left PCA in keeping with remote left PCA territory infarct.   MACRO: Denita Cox discussed the significance and urgency of this critical finding by telephone with  Dr Lopez on 7/23/2024 at 5:05 a.m.. (**-RCF-**) Findings:  See findings.   Signed by: Yefri Thomas 7/23/2024 8:29 AM Dictation workstation:   [id number]        Assessment/Plan   Michael Reed  is a 54 y.o. right handed male with history of L PCA stroke 2016 (w/ R HH and right hemiplegia), R PCA stroke 7/2024 both in setting of non-adherent to xarelto, Factor V Leiden (non-adherence to xarelto), HTN, HLD presenting as a BAT after being found down. On exam, he has dense right hemiplegia (worse from prior), severe expressive aphasia. CTH/CTA with L CAREN infarct and L A2 cut off - not a candidate for thrombolysis as out of the window with established hypodensity and on DOAC. His degree of aphasia doesn't fit with the L CAREN infarct - given that he was found down for unclear duration - will obtain vEEG to rule out any seizure activity.      Additionally, since ex-wife reports he has been adherent to xarelto (albeit a long hx of non-adherence) - given a new stroke while on xarelto presuming adherence, would need to discuss choice of AC and consider switching.     Updates 12/17:  -Discussing Xarelto compliance with son  -Embolic stroke--likely cardioembolic  -TTE pending read  -If compliant on Xarelto plan to switch to Eliquis, if not no change in AC  -EEG pending final read (prelim with L sided epileptogenic lesion)     Stroke Classification:  Type: Ischemic stroke  Subtype/etiology: undetermined - likely embolic   Vessels involved: L CAREN   Neurological manifestations:  Pre-Intervention Deficits: NIHSS 20 (with breakdown)  Pre-stroke mRS: 3  Initial treatment: None  Anti-platelets or Anti-coagulation management: Aspirin  Vascular Risk Factors: Factor V Leiden, HTN/HLD   Antiplatelet/antithrombotic plan for stroke prevention: ASA   VTE prophylaxis: lovenox      #L CAREN infarct   - Stroke work up:        - Utox        - , A1C 6.2, BNP 70       - TTE w/ bubble study          - Consider utility of MRI w/o contrast in AM   -  vEEG given aphasia on exam to rule out seizure --pending final read  - Continue home ASA 81 mg daily - hold off on AC for now given stroke burden.   -If compliant on Xarelto plan to switch  to Eliquis, if not no change in AC  - Continue home atorvastatin 40 mg daily   - SLP/PT/OT consult   - NPO until bedside swallow - will likely need enteral nutrition long term      #Factor V Leiden   -If compliant on Xarelto plan to switch to Eliquis, if not no change in AC, holding iso stroke burden     #Recent heavy alcohol use   - unclear last drink   - [facility] protocol   - Thiamine 500 mg IV TID x3 days --> 100 mg daily PO     #HTN  #HLD   - Hold home antihypertensives - amlodipine 10 mg daily, Coreg 3.125 mg BID, Lisinopril 40 mg daily - to allow for permissive HTN sBP < 220 - PRN hydral, labetalol  - Continue atorvastatin 40 mg daily      Vascular Risk Factor modification goals:  Blood pressure goals: avoid hypotension SBP <100 that could worsen cerebral perfusion, Ischemic stroke- early permissive hypertension SBP < 220 mmHg with cautious inpatient lowering  Lipid Goals: education on healthy diet and statin therapy to maintain or achieve goal LDL-cholesterol < 70mg  Glucose Goals: early treatment of hyperglycemia to goal glucose 140-180 mg/dl with long-term goal A1c < 7%   Smoking Cessation and Education  Assessment for Rehabilitation needs   Patient and family education on signs and symptoms of stroke, calling 911, healthy strategies for stroke prevention.       F: PRN (caution - EF 30-35%)  E: PRN, K > 4, Mg > 2, Phos >3   N: NPO until bedside swallow/SLP assessment  A: PIV    DVT ppx: lovenox    GI ppx: not indicated        Code status: DNR ok for intubation (confirmed on admission with ex-wife)  NOK: Tiffany Reed (ex-wife - not , POA) - [phone]     Destin Madrid DO  PGY-2 Neurology  Stroke Neurology [id number]    Pt seen and discussed with the attending physician, Dr. Leon.

## 2024-12-18 ENCOUNTER — PHARMACY VISIT (OUTPATIENT)
Dept: PHARMACY | Facility: CLINIC | Age: 54
End: 2024-12-18
Payer: COMMERCIAL

## 2024-12-18 LAB
ALBUMIN SERPL BCP-MCNC: 4.2 G/DL (ref 3.4–5)
ANION GAP SERPL CALC-SCNC: 13 MMOL/L (ref 10–20)
BASOPHILS # BLD AUTO: 0.04 X10*3/UL (ref 0–0.1)
BASOPHILS NFR BLD AUTO: 0.4 %
BUN SERPL-MCNC: 19 MG/DL (ref 6–23)
CALCIUM SERPL-MCNC: 9.5 MG/DL (ref 8.6–10.6)
CHLORIDE SERPL-SCNC: 107 MMOL/L (ref 98–107)
CO2 SERPL-SCNC: 27 MMOL/L (ref 21–32)
CREAT SERPL-MCNC: 1.98 MG/DL (ref 0.5–1.3)
EGFRCR SERPLBLD CKD-EPI 2021: 39 ML/MIN/1.73M*2
EOSINOPHIL # BLD AUTO: 0.09 X10*3/UL (ref 0–0.7)
EOSINOPHIL NFR BLD AUTO: 1 %
ERYTHROCYTE [DISTWIDTH] IN BLOOD BY AUTOMATED COUNT: 17.1 % (ref 11.5–14.5)
GLUCOSE BLD MANUAL STRIP-MCNC: 125 MG/DL (ref 74–99)
GLUCOSE BLD MANUAL STRIP-MCNC: 154 MG/DL (ref 74–99)
GLUCOSE BLD MANUAL STRIP-MCNC: 99 MG/DL (ref 74–99)
GLUCOSE SERPL-MCNC: 108 MG/DL (ref 74–99)
HCT VFR BLD AUTO: 44.6 % (ref 41–52)
HGB BLD-MCNC: 14.1 G/DL (ref 13.5–17.5)
IMM GRANULOCYTES # BLD AUTO: 0.04 X10*3/UL (ref 0–0.7)
IMM GRANULOCYTES NFR BLD AUTO: 0.4 % (ref 0–0.9)
LYMPHOCYTES # BLD AUTO: 1.72 X10*3/UL (ref 1.2–4.8)
LYMPHOCYTES NFR BLD AUTO: 19.1 %
MAGNESIUM SERPL-MCNC: 2.54 MG/DL (ref 1.6–2.4)
MCH RBC QN AUTO: 28.4 PG (ref 26–34)
MCHC RBC AUTO-ENTMCNC: 31.6 G/DL (ref 32–36)
MCV RBC AUTO: 90 FL (ref 80–100)
MONOCYTES # BLD AUTO: 0.72 X10*3/UL (ref 0.1–1)
MONOCYTES NFR BLD AUTO: 8 %
NEUTROPHILS # BLD AUTO: 6.38 X10*3/UL (ref 1.2–7.7)
NEUTROPHILS NFR BLD AUTO: 71.1 %
NRBC BLD-RTO: 0 /100 WBCS (ref 0–0)
PHOSPHATE SERPL-MCNC: 2.6 MG/DL (ref 2.5–4.9)
PLATELET # BLD AUTO: 231 X10*3/UL (ref 150–450)
POTASSIUM SERPL-SCNC: 3.5 MMOL/L (ref 3.5–5.3)
RBC # BLD AUTO: 4.97 X10*6/UL (ref 4.5–5.9)
SODIUM SERPL-SCNC: 143 MMOL/L (ref 136–145)
WBC # BLD AUTO: 9 X10*3/UL (ref 4.4–11.3)

## 2024-12-18 PROCEDURE — 2500000001 HC RX 250 WO HCPCS SELF ADMINISTERED DRUGS (ALT 637 FOR MEDICARE OP)

## 2024-12-18 PROCEDURE — 97530 THERAPEUTIC ACTIVITIES: CPT | Mod: GP

## 2024-12-18 PROCEDURE — 80069 RENAL FUNCTION PANEL: CPT

## 2024-12-18 PROCEDURE — 82947 ASSAY GLUCOSE BLOOD QUANT: CPT

## 2024-12-18 PROCEDURE — 95715 VEEG EA 12-26HR INTMT MNTR: CPT

## 2024-12-18 PROCEDURE — 92526 ORAL FUNCTION THERAPY: CPT | Mod: GN

## 2024-12-18 PROCEDURE — 2500000004 HC RX 250 GENERAL PHARMACY W/ HCPCS (ALT 636 FOR OP/ED)

## 2024-12-18 PROCEDURE — 92507 TX SP LANG VOICE COMM INDIV: CPT | Mod: GN

## 2024-12-18 PROCEDURE — 36415 COLL VENOUS BLD VENIPUNCTURE: CPT

## 2024-12-18 PROCEDURE — 1200000002 HC GENERAL ROOM WITH TELEMETRY DAILY

## 2024-12-18 PROCEDURE — 83735 ASSAY OF MAGNESIUM: CPT

## 2024-12-18 PROCEDURE — 99232 SBSQ HOSP IP/OBS MODERATE 35: CPT

## 2024-12-18 PROCEDURE — 2500000002 HC RX 250 W HCPCS SELF ADMINISTERED DRUGS (ALT 637 FOR MEDICARE OP, ALT 636 FOR OP/ED)

## 2024-12-18 PROCEDURE — 85025 COMPLETE CBC W/AUTO DIFF WBC: CPT

## 2024-12-18 RX ADMIN — POLYETHYLENE GLYCOL 3350 17 G: 17 POWDER, FOR SOLUTION ORAL at 08:46

## 2024-12-18 RX ADMIN — FOLIC ACID 1 MG: 1 TABLET ORAL at 08:46

## 2024-12-18 RX ADMIN — THIAMINE HYDROCHLORIDE 500 MG: 100 INJECTION, SOLUTION INTRAMUSCULAR; INTRAVENOUS at 00:11

## 2024-12-18 RX ADMIN — ATORVASTATIN CALCIUM 40 MG: 40 TABLET, FILM COATED ORAL at 21:22

## 2024-12-18 RX ADMIN — ENOXAPARIN SODIUM 40 MG: 100 INJECTION SUBCUTANEOUS at 12:17

## 2024-12-18 RX ADMIN — PAROXETINE 20 MG: 20 TABLET, FILM COATED ORAL at 08:46

## 2024-12-18 RX ADMIN — Medication 1 TABLET: at 08:46

## 2024-12-18 RX ADMIN — GABAPENTIN 300 MG: 300 CAPSULE ORAL at 06:05

## 2024-12-18 RX ADMIN — FLUTICASONE FUROATE AND VILANTEROL TRIFENATATE 1 PUFF: 200; 25 POWDER RESPIRATORY (INHALATION) at 08:47

## 2024-12-18 RX ADMIN — THIAMINE HYDROCHLORIDE 500 MG: 100 INJECTION, SOLUTION INTRAMUSCULAR; INTRAVENOUS at 08:46

## 2024-12-18 RX ADMIN — GABAPENTIN 300 MG: 300 CAPSULE ORAL at 14:08

## 2024-12-18 RX ADMIN — GABAPENTIN 300 MG: 300 CAPSULE ORAL at 21:22

## 2024-12-18 RX ADMIN — ASPIRIN 81 MG: 81 TABLET, CHEWABLE ORAL at 06:05

## 2024-12-18 RX ADMIN — THIAMINE HYDROCHLORIDE 500 MG: 100 INJECTION, SOLUTION INTRAMUSCULAR; INTRAVENOUS at 23:57

## 2024-12-18 RX ADMIN — THIAMINE HYDROCHLORIDE 500 MG: 100 INJECTION, SOLUTION INTRAMUSCULAR; INTRAVENOUS at 16:58

## 2024-12-18 ASSESSMENT — LIFESTYLE VARIABLES
AUDITORY DISTURBANCES: NOT PRESENT
ANXIETY: NO ANXIETY, AT EASE
AGITATION: NORMAL ACTIVITY
HEADACHE, FULLNESS IN HEAD: NOT PRESENT
ANXIETY: NO ANXIETY, AT EASE
TREMOR: NO TREMOR
TOTAL SCORE: 0
PAROXYSMAL SWEATS: NO SWEAT VISIBLE
ORIENTATION AND CLOUDING OF SENSORIUM: ORIENTED AND CAN DO SERIAL ADDITIONS
HEADACHE, FULLNESS IN HEAD: NOT PRESENT
AUDITORY DISTURBANCES: NOT PRESENT
AGITATION: NORMAL ACTIVITY
TREMOR: NO TREMOR
ORIENTATION AND CLOUDING OF SENSORIUM: ORIENTED AND CAN DO SERIAL ADDITIONS
VISUAL DISTURBANCES: NOT PRESENT
TOTAL SCORE: 0
VISUAL DISTURBANCES: NOT PRESENT
PAROXYSMAL SWEATS: NO SWEAT VISIBLE
NAUSEA AND VOMITING: NO NAUSEA AND NO VOMITING
NAUSEA AND VOMITING: NO NAUSEA AND NO VOMITING

## 2024-12-18 ASSESSMENT — PAIN SCALES - PAIN ASSESSMENT IN ADVANCED DEMENTIA (PAINAD)
TOTALSCORE: 0
CONSOLABILITY: NO NEED TO CONSOLE
TOTALSCORE: 0
BODYLANGUAGE: RELAXED
CONSOLABILITY: NO NEED TO CONSOLE
BODYLANGUAGE: RELAXED
BREATHING: NORMAL
BREATHING: NORMAL
FACIALEXPRESSION: SMILING OR INEXPRESSIVE
TOTALSCORE: 0
FACIALEXPRESSION: SMILING OR INEXPRESSIVE
CONSOLABILITY: NO NEED TO CONSOLE
BREATHING: NORMAL
BODYLANGUAGE: RELAXED
BREATHING: NORMAL
TOTALSCORE: 0
TOTALSCORE: 0
BODYLANGUAGE: RELAXED
FACIALEXPRESSION: SMILING OR INEXPRESSIVE
CONSOLABILITY: NO NEED TO CONSOLE
BODYLANGUAGE: RELAXED
BREATHING: NORMAL
FACIALEXPRESSION: SMILING OR INEXPRESSIVE
CONSOLABILITY: NO NEED TO CONSOLE
FACIALEXPRESSION: SMILING OR INEXPRESSIVE

## 2024-12-18 ASSESSMENT — COGNITIVE AND FUNCTIONAL STATUS - GENERAL
MOVING FROM LYING ON BACK TO SITTING ON SIDE OF FLAT BED WITH BEDRAILS: A LITTLE
EATING MEALS: TOTAL
MOBILITY SCORE: 12
DRESSING REGULAR UPPER BODY CLOTHING: TOTAL
WALKING IN HOSPITAL ROOM: A LOT
WALKING IN HOSPITAL ROOM: A LOT
MOVING TO AND FROM BED TO CHAIR: A LOT
CLIMB 3 TO 5 STEPS WITH RAILING: A LOT
MOVING TO AND FROM BED TO CHAIR: A LOT
PERSONAL GROOMING: TOTAL
CLIMB 3 TO 5 STEPS WITH RAILING: TOTAL
MOBILITY SCORE: 15
STANDING UP FROM CHAIR USING ARMS: A LITTLE
TURNING FROM BACK TO SIDE WHILE IN FLAT BAD: A LOT
TURNING FROM BACK TO SIDE WHILE IN FLAT BAD: A LITTLE
STANDING UP FROM CHAIR USING ARMS: A LITTLE
TOILETING: TOTAL
HELP NEEDED FOR BATHING: TOTAL
MOVING FROM LYING ON BACK TO SITTING ON SIDE OF FLAT BED WITH BEDRAILS: A LOT
DRESSING REGULAR LOWER BODY CLOTHING: TOTAL
DAILY ACTIVITIY SCORE: 6

## 2024-12-18 NOTE — PROGRESS NOTES
Inpatient SLP Communication & Swallow Therapy     Patient Name: Michael Reed  MRN: [id number]  Today's Date: 12/18/2024  Time Calculation  Start Time: 1058  Stop Time: 1127  Time Calculation (min): 29 min         Swallow Recommendations:   Solid Diet Recommendations : Regular (IDDSI Level 7)  Liquid Diet Recommendations: Thin (IDDSI Level 0)  Compensatory Swallowing Strategies: Eat/feed slowly, Small bites/sips  Frequent oral care    SLP Assessment:  Swallow therapy completed targeting: diet tolerance/assessment of need for modification. Pt presenting w/ functional oral phase and no s/s aspiration/pharyngeal dysphagia upon re-eval this date. RN reported pt has done well w/ PO intake since starting diet w/o any concerns. Recommend pt continue regular texture diet, no further SLP dysphagia services warranted at this time.     Communication therapy completed targeting: ongoing assessment and establishing a reliable means of communicating wants/needs. Pt w/ minimal progress w/ communication this date still w/ severe expressive/receptive deficits, however did note increased initiation for yes/no type responses (60% accurate for basic questions). Still not stimulable for low-tech communication board. Will continue to target deficits.        Plan:  SLP TX Plan: Continue Plan of Care  SLP Plan: Skilled SLP  SLP Frequency: 3x per week  Duration: 2 weeks  SLP Discharge Recommendations:  (Ongoing SLP targeting communication, no further SLP for swallow at discharge)  Discussed POC: Patient, Nursing, Physician  SLP - OK to Discharge: Yes    Swallow Goals:  Pt will tolerate least restrictive diet without s/s aspiration, respiratory compromise, or overt difficulty throughout admission.  Start: 12/17/24, End: 12/24/24  Status: MET    Communication Goals:   Pt will follow basic/one-step commands with max cues for accurate completion >80% opportunities.   Start: 12/17/24, End: 01/17/25   Progressing   Pt will communicate  "wants/needs/thoughts during sessions and with nursing/medical staff via any modality given max cues.   Start: 12/17/24, End: 01/17/25    Progressing       Subjective   Current Presentation: Upright and alert, seated at edge of bed. Willing to participate. Noted intermittent signs of frustration.     Breathing on room air.     Objective     Therapeutic Swallow Therapy:    Pt self fed pretzels and sips of water independently. Noted impulsive bite size however mastication was timely. No overt difficulty or s/s aspiration noted. Non-significant oral residues noted given spontaneous liquid wash.      Expression:      Repetition: Cued pt to repeat phonemes given model/max cues; pt attempted to move articulators however no voicing noted. Pt became visible frustrated.     Automatics: Pt prompted to count- he independently counted to 5 utilizing fingers and approximated \"one\" w/ max cues/model.     Language Comprehension:    Yes/no questions: Pt initiated responses via head nod/shake w/ mod cues, accuracy 60% for basic questions.    Object ID: Given written and verbal prompt for object name, field of two. Limited initiation for pointing/selecting items despite max cues.     Basic commands: Pt initiated responses to  commands 50% of trials, accurate execution.     Inpatient Education:  Pt educated on result and recommendations. Pt did not indicate understanding.         "

## 2024-12-18 NOTE — PROGRESS NOTES
Patient's wife identified choices in [facility]. Referrals sent to choices. Justine Gonzalez RN, TCC    1706: Notified by [facility] that patient's wife chose their facility for patient to come to. Called patient's wife, Tiffany, to confirm - stated [facility] is [facility]. Justine Gonzalez RN, TCC

## 2024-12-18 NOTE — PROGRESS NOTES
Physical Therapy    Physical Therapy Treatment    Patient Name: Michael Reed  MRN: [id number]  Today's Date: 12/18/2024  Time Calculation  Start Time: 1407  Stop Time: 1419  Time Calculation (min): 12 min       Assessment/Plan   PT Assessment  End of Session Communication: Bedside nurse  Assessment Comment: Pt demonstrated improvement in mobility and command following this visit. Pt is at high risk for falls and remains appropriate for high intensity PT.  End of Session Patient Position: Up in chair, Alarm on     PT Plan  Treatment/Interventions: Bed mobility, Transfer training, Balance training, Strengthening, Therapeutic exercise, Neuromuscular re-education  PT Plan: Ongoing PT  PT Frequency: 4 times per week  PT Discharge Recommendations: High intensity level of continued care  PT Recommended Transfer Status: Assist x2  PT - OK to Discharge: Yes      General Visit Information:   PT  Visit  PT Received On: 12/18/24  Prior to Session Communication: Bedside nurse  Patient Position Received: Up in chair, Alarm on     Subjective   Precautions:  Precautions  Medical Precautions: Fall precautions       Objective   Pain:  Pain Assessment  Pain Assessment:  (no verbal or non-verbal signs of pain)  Cognition:  Cognition  Overall Cognitive Status: Impaired  Following Commands:  (followed 25-50% of one step commands with repeated cues and demonstration)    Postural Control:     Static Standing Balance  Static Standing-Balance Support: Left upper extremity supported  Static Standing-Level of Assistance: Minimum assistance  Dynamic Standing Balance  Dynamic Standing-Balance Support: Left upper extremity supported  Dynamic Standing-Level of Assistance:  (min/mod assist)    PT Treatments:  Therapeutic Exercise  Therapeutic Exercise Performed: Yes  Therapeutic Exercise Activity 1: PROM RLE x10: ankle DF/PF, knee flex/ext, hip flex; pt declined to perform ther-ex on LLE        Bed Mobility  Bed Mobility: No (pt in chair pre/post  visit)     Transfers  Transfer: Yes  Transfer 1  Technique 1: Sit to stand, Stand to sit  Transfer Device 1:  (LUE on bed rail, RUE supported by PT)  Transfer Level of Assistance 1: Minimum assistance  Trials/Comments 1: stood 3x; verbal cues for safety; stood ~5 seconds on first 2 trials and ~20 seconds on last trial             Outcome Measures:  [facility] Basic Mobility  Turning from your back to your side while in a flat bed without using bedrails: A lot  Moving from lying on your back to sitting on the side of a flat bed without using bedrails: A lot  Moving to and from bed to chair (including a wheelchair): A lot  Standing up from a chair using your arms (e.g. wheelchair or bedside chair): A little  To walk in hospital room: A lot  Climbing 3-5 steps with railing: Total  Basic Mobility - Total Score: 12                            Education Documentation  Mobility Training, taught by Brittany Arzate, PT at 12/18/2024  3:00 PM.  Learner: Patient  Readiness: Acceptance  Method: Explanation  Response: Needs Reinforcement    Education Comments  No comments found.               Encounter Problems       Encounter Problems (Active)       Balance       supervision static/dynamic sitting  (Progressing)       Start:  12/17/24    Expected End:  01/07/25               Balance       CGA static/dynamic stance with UE       Start:  12/18/24    Expected End:  01/07/25               Mobility       Pt will actively participate in BLE ther-ex in order to improve BLE strength and to assist with the completion of functional mobility tasks.  (Progressing)       Start:  12/17/24    Expected End:  01/07/25               Mobility       STG - Patient will ambulate 50 feet with LRD with min assist       Start:  12/18/24    Expected End:  01/07/25               PT Transfers       STG - Transfer from bed to chair with LRD with mod assist x2 (Met)       Start:  12/17/24    Expected End:  01/07/25    Resolved:  12/18/24    Updated to: STG -  Transfer from bed to chair with LRD with min assist    Update reason: pt progress         STG - Patient will perform bed mobility with mod assist (Met)       Start:  12/17/24    Expected End:  01/07/25    Resolved:  12/18/24    Updated to: STG - Patient will perform bed mobility with CGA    Update reason: pt progress         STG - Patient will transfer sit to and from stand with LRD with mod assist x2 (Met)       Start:  12/17/24    Expected End:  01/07/25    Resolved:  12/18/24    Updated to: STG - Patient will transfer sit to and from stand with LRD with CGA         STG - Transfer from bed to chair with LRD with min assist (Progressing)       Start:  12/18/24    Expected End:  01/07/25                STG - Patient will perform bed mobility with CGA (Progressing)       Start:  12/18/24    Expected End:  01/07/25                STG - Patient will transfer sit to and from stand with LRD with CGA (Progressing)       Start:  12/18/24    Expected End:  01/07/25 12/18/24 at 3:01 PM   Brittany Arzate, PT

## 2024-12-18 NOTE — PROGRESS NOTES
ID: Michael Reed is a 54 y.o. male on hospital day 2 for acute cerebral infarction.    Subjective   NAEON. In to see patient this AM, no new/acute complaints. Eating breakfast in NAD.    Objective   Vitals 24hrs:  Heart Rate:  []   Temp:  [36.1 °C (97 °F)-36.6 °C (97.9 °F)]   Resp:  [18-20]   BP: (126-165)/(81-99)   SpO2:  [93 %-97 %]      I/Os 2hr:  Intake/Output Summary (Last 24 hours) at 12/18/2024 0739  Last data filed at 12/18/2024 0605  Gross per 24 hour   Intake 240 ml   Output 200 ml   Net 40 ml     Physical Exam:  Alert. Mute with no comprehensible sounds. Some degree of receptive aphasia, mimicking but not following commands.  R HH (known) on BTT testing   Right facial droop noted   Muscle bulk and tone were normal   R sided hemiplegia, LUE/LLE AG spontaneously w/o drift  SILT to nox stim    NIHSS  NIH Stroke Scale  1A. Level of Consciousness: Alert, Keenly Responsive  1B. Ask Month and Age: No Questions Right  1C. Blink Eyes & Squeeze Hands: Performs 1 Task  2. Best Gaze: Normal  3. Visual: No Visual Loss  4. Facial Palsy: Minor Paralysis  5A. Motor - Left Arm: No Drift  5B. Motor - Right Arm: No Movement  6A. Motor - Left Leg: No Drift  6B. Motor - Right Leg: No Movement  7. Limb Ataxia: Present in Two Limbs  8. Sensory Loss: Mild-to-Moderate Sensory Loss  9. Best Language: Mute, Global Aphasia  10. Dysarthria: Severe Dysarthria  11. Extinction and Inattention: Visual, Tactile, Auditory, Spatial, or Personal Inattention  NIH Stroke Scale: 21           Alzada Coma Scale  Best Eye Response: To verbal stimuli  Best Verbal Response: Incomprehensible sounds  Best Motor Response: Follows commands  Alzada Coma Scale Score: 11          Medications  Scheduled:  aspirin, 81 mg, oral, Daily  atorvastatin, 40 mg, oral, Nightly  enoxaparin, 40 mg, subcutaneous, Daily  fluticasone furoate-vilanteroL, 1 puff, inhalation, Daily  folic acid, 1 mg, oral, Daily  gabapentin, 300 mg, oral, q8h MARILU  multivitamin with  minerals, 1 tablet, oral, Daily  PARoxetine, 20 mg, oral, Daily  polyethylene glycol, 17 g, oral, Daily  [START ON 12/19/2024] thiamine, 100 mg, oral, Daily  thiamine, 500 mg, intravenous, q8h MARILU      PRN:  PRN medications: hydrALAZINE **FOLLOWED BY** hydrALAZINE, labetaloL, oxygen   Continuous:     Lab Results  Results from last 72 hours   Lab Units 12/17/24  0842 12/16/24 1922   SODIUM mmol/L 144 144   POTASSIUM mmol/L 3.6 3.5   BUN mg/dL 17 19   CREATININE mg/dL 1.56* 1.77*   CALCIUM mg/dL 9.1 9.7   MAGNESIUM mg/dL 2.32  --       Results from last 72 hours   Lab Units 12/17/24 0842 12/16/24 2021   WBC AUTO x10*3/uL 9.5 10.0   HEMOGLOBIN g/dL 13.0* 13.1*   HEMATOCRIT % 42.7 39.0*   PLATELETS AUTO x10*3/uL 198 236      Results from last 72 hours   Lab Units 12/16/24 1922   AST U/L 88*   ALT U/L 23   ALK PHOS U/L 94       Results from last 72 hours   Lab Units 12/16/24 2103   INR  1.1      Results from last 72 hours   Lab Units 12/16/24 2021 12/16/24 1922   HEMOGLOBIN A1C % 6.2*  --    LDL CALC mg/dL  --  110*        Imaging Results  MR brain wo IV contrast    Result Date: 7/23/2024  MRI BRAIN: 1. Acute cortical infarct in the medial-most right occipital lobe involving PCA territory. 2. Chronic left PCA territory infarct involving the occipital lobe, parahippocampal gyrus, and dorsal medial left thalamus. 3. Moderate burden of supratentorial and infratentorial chronic small vessel ischemic changes including left cerebellar infarct, central pontine  infarct, supratentorial white matter disease.   MRA HEAD AND NECK: 1. No evidence of major vessel intracranial or extracranial occlusion. 2. Moderate narrowing of the intracranial vertebral artery which is slightly progressed from 05/21/2016. 3. Chronically occluded left PCA in keeping with remote left PCA territory infarct.   MACRO: Denita Cox discussed the significance and urgency of this critical finding by telephone with  Dr Lopez on 7/23/2024  at 5:05 a.m.. (**-RCF-**) Findings:  See findings.   Signed by: Yefri Thomas 7/23/2024 8:29 AM Dictation workstation:   [id number]     Assessment/Plan   Michael Reed is a 54 y.o. right handed male with history of L PCA stroke 2016 (w/ R HH and right hemiplegia), R PCA stroke 7/2024 both in setting of non-adherent to xarelto, Factor V Leiden (non-adherence to xarelto), HTN, HLD presenting as a BAT after being found down. On exam, he has dense right hemiplegia (worse from prior), severe expressive aphasia. CTH/CTA with L CAREN infarct and L A2 cut off - not a candidate for thrombolysis as out of the window with established hypodensity and on DOAC. His degree of aphasia doesn't fit with the L CAREN infarct seen on CTH. vEEG negative for epileptiform discharges. Will obtain MRI to assess stroke burden.    Questionable compliance of Xarelto. Ex wife reports compliance however per fill history, last fill in October for 30 day supply.      Updates 12/18/24:  - EEG indicative of structural lesion in L hemisphere, no epileptiform discharges  - TTE with EF 40%, impaired relaxation, indicative of intrapulmonary shunt (late appearance of contrast on left side)  - MRI ordered to assess stroke burden  - Questionable compliance with Xarelto, fill records show last fill October for 30 day supply  - If compliant on Xarelto plan to switch to Eliquis, if not no change in AC     Stroke Classification:  Type: Ischemic stroke  Subtype/etiology: undetermined - likely embolic   Vessels involved: L CAREN   Neurological manifestations:  Pre-Intervention Deficits: NIHSS 20 (with breakdown)  Pre-stroke mRS: 3  Initial treatment: None  Anti-platelets or Anti-coagulation management: Aspirin  Vascular Risk Factors: Factor V Leiden, HTN/HLD   Antiplatelet/antithrombotic plan for stroke prevention: ASA   VTE prophylaxis: lovenox      #L CAREN infarct   - Stroke work up:        - Utox negative       - , A1C 6.2, BNP 70       - TTE w/ bubble  study: EF 40%, impaired relaxation and intrapulmonary shunt       - Consider utility of MRI w/o contrast in AM   -  vEEG given aphasia on exam to rule out seizure   - L hemispheric lesion, no epileptiform discharges   - EEG discontinued  - MRI pending for stroke burden and aphasia not explained by findings on CTH  - Continue home ASA 81 mg daily - hold off on AC for now given stroke burden.   - If compliant on Xarelto plan to switch to Eliquis, if not no change in AC  - Continue home atorvastatin 40 mg daily   - SLP/PT/OT consult   - Regular diet     #Factor V Leiden   - If compliant on Xarelto plan to switch to Eliquis, if not no change in AC, holding iso stroke burden     #Recent heavy alcohol use   - Unclear last drink   - [facility] protocol  - Thiamine 500 mg IV TID x3 days --> 100 mg daily PO     #HTN  #HLD   - Hold home antihypertensives - amlodipine 10 mg daily, Coreg 3.125 mg BID, Lisinopril 40 mg daily - to allow for permissive HTN sBP < 220 - PRN hydral, labetalol  - Continue atorvastatin 40 mg daily      Vascular Risk Factor modification goals:  Blood pressure goals: avoid hypotension SBP <100 that could worsen cerebral perfusion, Ischemic stroke- early permissive hypertension SBP < 220 mmHg with cautious inpatient lowering  Lipid Goals: education on healthy diet and statin therapy to maintain or achieve goal LDL-cholesterol < 70mg  Glucose Goals: early treatment of hyperglycemia to goal glucose 140-180 mg/dl with long-term goal A1c < 7%   Smoking Cessation and Education  Assessment for Rehabilitation needs   Patient and family education on signs and symptoms of stroke, calling 911, healthy strategies for stroke prevention.       F: PRN (caution - EF 30-35%)  E: PRN, K > 4, Mg > 2, Phos >3   N: Regular diet  A: PIV    DVT ppx: lovenox    GI ppx: not indicated        Code status: DNR ok for intubation (confirmed on admission with ex-wife)  NOK: Tiffany Reed (ex-wife - not , POA) - [phone]      Felix Hunter MD  PGY-2 Neurology  Stroke Neurology [id number]    Pt seen and discussed with the attending physician, Dr. Colbert.

## 2024-12-19 ENCOUNTER — APPOINTMENT (OUTPATIENT)
Dept: RADIOLOGY | Facility: HOSPITAL | Age: 54
End: 2024-12-19
Payer: MEDICARE

## 2024-12-19 LAB
ALBUMIN SERPL BCP-MCNC: 3.4 G/DL (ref 3.4–5)
ANION GAP SERPL CALC-SCNC: 11 MMOL/L (ref 10–20)
BASOPHILS # BLD AUTO: 0.03 X10*3/UL (ref 0–0.1)
BASOPHILS NFR BLD AUTO: 0.4 %
BUN SERPL-MCNC: 15 MG/DL (ref 6–23)
CALCIUM SERPL-MCNC: 8.7 MG/DL (ref 8.6–10.6)
CHLORIDE SERPL-SCNC: 107 MMOL/L (ref 98–107)
CO2 SERPL-SCNC: 27 MMOL/L (ref 21–32)
CREAT SERPL-MCNC: 1.58 MG/DL (ref 0.5–1.3)
EGFRCR SERPLBLD CKD-EPI 2021: 52 ML/MIN/1.73M*2
EOSINOPHIL # BLD AUTO: 0.1 X10*3/UL (ref 0–0.7)
EOSINOPHIL NFR BLD AUTO: 1.4 %
ERYTHROCYTE [DISTWIDTH] IN BLOOD BY AUTOMATED COUNT: 16.7 % (ref 11.5–14.5)
GLUCOSE BLD MANUAL STRIP-MCNC: 107 MG/DL (ref 74–99)
GLUCOSE BLD MANUAL STRIP-MCNC: 142 MG/DL (ref 74–99)
GLUCOSE BLD MANUAL STRIP-MCNC: 237 MG/DL (ref 74–99)
GLUCOSE SERPL-MCNC: 102 MG/DL (ref 74–99)
HCT VFR BLD AUTO: 37.6 % (ref 41–52)
HGB BLD-MCNC: 12.4 G/DL (ref 13.5–17.5)
IMM GRANULOCYTES # BLD AUTO: 0.03 X10*3/UL (ref 0–0.7)
IMM GRANULOCYTES NFR BLD AUTO: 0.4 % (ref 0–0.9)
LYMPHOCYTES # BLD AUTO: 1.8 X10*3/UL (ref 1.2–4.8)
LYMPHOCYTES NFR BLD AUTO: 24.6 %
MAGNESIUM SERPL-MCNC: 2.21 MG/DL (ref 1.6–2.4)
MCH RBC QN AUTO: 28.5 PG (ref 26–34)
MCHC RBC AUTO-ENTMCNC: 33 G/DL (ref 32–36)
MCV RBC AUTO: 86 FL (ref 80–100)
MONOCYTES # BLD AUTO: 0.69 X10*3/UL (ref 0.1–1)
MONOCYTES NFR BLD AUTO: 9.4 %
NEUTROPHILS # BLD AUTO: 4.68 X10*3/UL (ref 1.2–7.7)
NEUTROPHILS NFR BLD AUTO: 63.8 %
NRBC BLD-RTO: 0 /100 WBCS (ref 0–0)
PHOSPHATE SERPL-MCNC: 4.2 MG/DL (ref 2.5–4.9)
PLATELET # BLD AUTO: 218 X10*3/UL (ref 150–450)
POTASSIUM SERPL-SCNC: 3.2 MMOL/L (ref 3.5–5.3)
RBC # BLD AUTO: 4.35 X10*6/UL (ref 4.5–5.9)
SODIUM SERPL-SCNC: 142 MMOL/L (ref 136–145)
WBC # BLD AUTO: 7.3 X10*3/UL (ref 4.4–11.3)

## 2024-12-19 PROCEDURE — 95720 EEG PHY/QHP EA INCR W/VEEG: CPT | Performed by: STUDENT IN AN ORGANIZED HEALTH CARE EDUCATION/TRAINING PROGRAM

## 2024-12-19 PROCEDURE — 99232 SBSQ HOSP IP/OBS MODERATE 35: CPT

## 2024-12-19 PROCEDURE — 97535 SELF CARE MNGMENT TRAINING: CPT | Mod: GO

## 2024-12-19 PROCEDURE — 94640 AIRWAY INHALATION TREATMENT: CPT

## 2024-12-19 PROCEDURE — 2500000004 HC RX 250 GENERAL PHARMACY W/ HCPCS (ALT 636 FOR OP/ED)

## 2024-12-19 PROCEDURE — 95715 VEEG EA 12-26HR INTMT MNTR: CPT

## 2024-12-19 PROCEDURE — 2500000001 HC RX 250 WO HCPCS SELF ADMINISTERED DRUGS (ALT 637 FOR MEDICARE OP)

## 2024-12-19 PROCEDURE — 70551 MRI BRAIN STEM W/O DYE: CPT | Performed by: RADIOLOGY

## 2024-12-19 PROCEDURE — 85025 COMPLETE CBC W/AUTO DIFF WBC: CPT

## 2024-12-19 PROCEDURE — 84100 ASSAY OF PHOSPHORUS: CPT

## 2024-12-19 PROCEDURE — 83735 ASSAY OF MAGNESIUM: CPT

## 2024-12-19 PROCEDURE — 1200000002 HC GENERAL ROOM WITH TELEMETRY DAILY

## 2024-12-19 PROCEDURE — 82947 ASSAY GLUCOSE BLOOD QUANT: CPT

## 2024-12-19 PROCEDURE — 2500000002 HC RX 250 W HCPCS SELF ADMINISTERED DRUGS (ALT 637 FOR MEDICARE OP, ALT 636 FOR OP/ED)

## 2024-12-19 PROCEDURE — 97530 THERAPEUTIC ACTIVITIES: CPT | Mod: GP

## 2024-12-19 PROCEDURE — 70551 MRI BRAIN STEM W/O DYE: CPT

## 2024-12-19 PROCEDURE — 36415 COLL VENOUS BLD VENIPUNCTURE: CPT

## 2024-12-19 RX ORDER — AMLODIPINE BESYLATE 10 MG/1
10 TABLET ORAL DAILY
Status: DISCONTINUED | OUTPATIENT
Start: 2024-12-19 | End: 2024-12-20 | Stop reason: HOSPADM

## 2024-12-19 RX ADMIN — GABAPENTIN 300 MG: 300 CAPSULE ORAL at 21:03

## 2024-12-19 RX ADMIN — FLUTICASONE FUROATE AND VILANTEROL TRIFENATATE 1 PUFF: 200; 25 POWDER RESPIRATORY (INHALATION) at 08:38

## 2024-12-19 RX ADMIN — ASPIRIN 81 MG: 81 TABLET, CHEWABLE ORAL at 06:38

## 2024-12-19 RX ADMIN — Medication 1 TABLET: at 09:07

## 2024-12-19 RX ADMIN — FOLIC ACID 1 MG: 1 TABLET ORAL at 09:07

## 2024-12-19 RX ADMIN — ENOXAPARIN SODIUM 40 MG: 100 INJECTION SUBCUTANEOUS at 11:31

## 2024-12-19 RX ADMIN — THIAMINE HYDROCHLORIDE 500 MG: 100 INJECTION, SOLUTION INTRAMUSCULAR; INTRAVENOUS at 16:20

## 2024-12-19 RX ADMIN — GABAPENTIN 300 MG: 300 CAPSULE ORAL at 14:55

## 2024-12-19 RX ADMIN — GABAPENTIN 300 MG: 300 CAPSULE ORAL at 06:38

## 2024-12-19 RX ADMIN — SODIUM CHLORIDE, POTASSIUM CHLORIDE, SODIUM LACTATE AND CALCIUM CHLORIDE 500 ML: 600; 310; 30; 20 INJECTION, SOLUTION INTRAVENOUS at 11:31

## 2024-12-19 RX ADMIN — THIAMINE HYDROCHLORIDE 500 MG: 100 INJECTION, SOLUTION INTRAMUSCULAR; INTRAVENOUS at 09:30

## 2024-12-19 RX ADMIN — AMLODIPINE BESYLATE 10 MG: 10 TABLET ORAL at 11:41

## 2024-12-19 RX ADMIN — PAROXETINE 20 MG: 20 TABLET, FILM COATED ORAL at 09:07

## 2024-12-19 RX ADMIN — ATORVASTATIN CALCIUM 40 MG: 40 TABLET, FILM COATED ORAL at 21:03

## 2024-12-19 ASSESSMENT — PAIN - FUNCTIONAL ASSESSMENT
PAIN_FUNCTIONAL_ASSESSMENT: 0-10

## 2024-12-19 ASSESSMENT — COGNITIVE AND FUNCTIONAL STATUS - GENERAL
MOVING FROM LYING ON BACK TO SITTING ON SIDE OF FLAT BED WITH BEDRAILS: A LITTLE
TOILETING: A LOT
DAILY ACTIVITIY SCORE: 14
TOILETING: A LOT
TURNING FROM BACK TO SIDE WHILE IN FLAT BAD: A LITTLE
TURNING FROM BACK TO SIDE WHILE IN FLAT BAD: A LOT
DRESSING REGULAR LOWER BODY CLOTHING: A LOT
EATING MEALS: A LITTLE
MOVING FROM LYING ON BACK TO SITTING ON SIDE OF FLAT BED WITH BEDRAILS: A LOT
DRESSING REGULAR UPPER BODY CLOTHING: A LOT
MOVING TO AND FROM BED TO CHAIR: A LOT
STANDING UP FROM CHAIR USING ARMS: A LITTLE
MOBILITY SCORE: 11
MOVING TO AND FROM BED TO CHAIR: A LOT
PERSONAL GROOMING: A LITTLE
WALKING IN HOSPITAL ROOM: A LOT
WALKING IN HOSPITAL ROOM: A LOT
CLIMB 3 TO 5 STEPS WITH RAILING: TOTAL
STANDING UP FROM CHAIR USING ARMS: A LOT
PERSONAL GROOMING: A LITTLE
DRESSING REGULAR LOWER BODY CLOTHING: A LOT
HELP NEEDED FOR BATHING: A LOT
MOBILITY SCORE: 14
HELP NEEDED FOR BATHING: A LOT
DAILY ACTIVITIY SCORE: 14
DRESSING REGULAR UPPER BODY CLOTHING: A LOT
CLIMB 3 TO 5 STEPS WITH RAILING: TOTAL
EATING MEALS: A LITTLE

## 2024-12-19 ASSESSMENT — PAIN SCALES - PAIN ASSESSMENT IN ADVANCED DEMENTIA (PAINAD)
TOTALSCORE: 0
FACIALEXPRESSION: SMILING OR INEXPRESSIVE
CONSOLABILITY: NO NEED TO CONSOLE
BODYLANGUAGE: RELAXED
BREATHING: NORMAL

## 2024-12-19 ASSESSMENT — PAIN SCALES - GENERAL
PAINLEVEL_OUTOF10: 0 - NO PAIN

## 2024-12-19 ASSESSMENT — ACTIVITIES OF DAILY LIVING (ADL): HOME_MANAGEMENT_TIME_ENTRY: 14

## 2024-12-19 NOTE — PROGRESS NOTES
Physical Therapy    Physical Therapy Treatment    Patient Name: Michael Reed  MRN: [id number]  Today's Date: 12/19/2024  Time Calculation  Start Time: 0944  Stop Time: 0958  Time Calculation (min): 14 min       Assessment/Plan   PT Assessment  End of Session Communication: Bedside nurse  End of Session Patient Position: Up in chair, Alarm on     PT Plan  Treatment/Interventions: Bed mobility, Transfer training, Balance training, Strengthening, Therapeutic exercise, Neuromuscular re-education  PT Plan: Ongoing PT  PT Frequency: 4 times per week  PT Discharge Recommendations: High intensity level of continued care  PT Recommended Transfer Status: Assist x2  PT - OK to Discharge: Yes      General Visit Information:   PT  Visit  PT Received On: 12/19/24  Co-Treatment: OT  Co-Treatment Reason: to maximize mobility and safety  Prior to Session Communication: Bedside nurse  Patient Position Received: Up in chair, Alarm on     Subjective   Precautions:  Precautions  Medical Precautions: Fall precautions       Objective   Pain:  Pain Assessment  Pain Assessment:  (no verbal or non-verbal signs of pain)  Cognition:  Cognition  Overall Cognitive Status: Impaired  Following Commands:  (followed 25-50% of commands)    Postural Control:     Static Sitting Balance  Static Sitting-Level of Assistance: Contact guard  Dynamic Sitting Balance  Dynamic Sitting-Level of Assistance: Minimum assistance  Static Standing Balance  Static Standing-Balance Support: Left upper extremity supported  Static Standing-Level of Assistance: Minimum assistance (x1-2)  Dynamic Standing Balance  Dynamic Standing-Balance Support: Left upper extremity supported  Dynamic Standing-Level of Assistance: Minimum assistance (x2)      PT Treatments:           Bed Mobility  Bed Mobility: No (pt in chair pre/post visit)  Ambulation/Gait Training  Ambulation/Gait Training Performed: Yes  Ambulation/Gait Training 1  Device 1: Kris Walker  Assistance 1: Minimum  assistance (x2)  Quality of Gait 1: Diminished heel strike, Inconsistent stride length, Decreased step length, Narrow base of support (decreased jaspal, unable to clear RLE from floor; verbal cues for safety)  Comments/Distance (ft) 1: 5 feet  Transfers  Transfer: Yes  Transfer 1  Technique 1: Sit to stand, Stand to sit  Transfer Device 1: Kris-walker  Transfer Level of Assistance 1: Moderate assistance, +2  Trials/Comments 1: stood 2x, verbal cues             Outcome Measures:  [facility] Basic Mobility  Turning from your back to your side while in a flat bed without using bedrails: A lot  Moving from lying on your back to sitting on the side of a flat bed without using bedrails: A lot  Moving to and from bed to chair (including a wheelchair): A lot  Standing up from a chair using your arms (e.g. wheelchair or bedside chair): A lot  To walk in hospital room: A lot  Climbing 3-5 steps with railing: Total  Basic Mobility - Total Score: 11                            Education Documentation  Mobility Training, taught by Brittany Arzate PT at 12/19/2024 11:36 AM.  Learner: Patient  Readiness: Acceptance  Method: Explanation  Response: Needs Reinforcement    Education Comments  No comments found.               Encounter Problems       Encounter Problems (Active)       Balance       supervision static/dynamic sitting  (Progressing)       Start:  12/17/24    Expected End:  01/07/25               Balance       CGA static/dynamic stance with UE (Progressing)       Start:  12/18/24    Expected End:  01/07/25               Mobility       Pt will actively participate in BLE ther-ex in order to improve BLE strength and to assist with the completion of functional mobility tasks.  (Progressing)       Start:  12/17/24    Expected End:  01/07/25               Mobility       STG - Patient will ambulate 50 feet with LRD with min assist (Progressing)       Start:  12/18/24    Expected End:  01/07/25               PT Transfers       STG  - Transfer from bed to chair with LRD with mod assist x2 (Met)       Start:  12/17/24    Expected End:  01/07/25    Resolved:  12/18/24    Updated to: STG - Transfer from bed to chair with LRD with min assist    Update reason: pt progress         STG - Patient will perform bed mobility with mod assist (Met)       Start:  12/17/24    Expected End:  01/07/25    Resolved:  12/18/24    Updated to: STG - Patient will perform bed mobility with CGA    Update reason: pt progress         STG - Patient will transfer sit to and from stand with LRD with mod assist x2 (Met)       Start:  12/17/24    Expected End:  01/07/25    Resolved:  12/18/24    Updated to: STG - Patient will transfer sit to and from stand with LRD with CGA         STG - Transfer from bed to chair with LRD with min assist (Progressing)       Start:  12/18/24    Expected End:  01/07/25                STG - Patient will perform bed mobility with CGA (Progressing)       Start:  12/18/24    Expected End:  01/07/25                STG - Patient will transfer sit to and from stand with LRD with CGA (Progressing)       Start:  12/18/24    Expected End:  01/07/25 12/19/24 at 11:37 AM   Brittany Arzate, PT

## 2024-12-19 NOTE — PROGRESS NOTES
ID: Michael Reed is a 54 y.o. male on hospital day 3 for acute cerebral infarction.    Subjective   MRI completed overnight, showed L CAREN infarct, focal subacute R temporal lobe.     This AM, no new/acute complaints. Eating breakfast in NAD. Receptive aphasia is better, following simple commands.    Objective   Vitals 24hrs:  Heart Rate:  []   Temp:  [36 °C (96.8 °F)-36.6 °C (97.9 °F)]   Resp:  [16-18]   BP: (131-178)/()   SpO2:  [94 %-97 %]      I/Os 2hr:  Intake/Output Summary (Last 24 hours) at 12/19/2024 0736  Last data filed at 12/18/2024 2100  Gross per 24 hour   Intake 550 ml   Output --   Net 550 ml     Physical Exam:  Alert sitting in NAD. Mute with no comprehensible sounds. Receptive aphasia improved, following simple commands.  R HH (known) on BTT testing  Right facial droop noted   Muscle bulk and tone were normal   R sided hemiplegia, LUE/LLE AG spontaneously w/o drift  SILT to nox stim    NIHSS  NIH Stroke Scale  1A. Level of Consciousness: Arouses to Minor Stimulation  1B. Ask Month and Age: No Questions Right  1C. Blink Eyes & Squeeze Hands: Performs Both Tasks  2. Best Gaze: Normal  3. Visual: No Visual Loss  4. Facial Palsy: Minor Paralysis  5A. Motor - Left Arm: No Drift  5B. Motor - Right Arm: No Movement  6A. Motor - Left Leg: No Drift  6B. Motor - Right Leg: No Movement  7. Limb Ataxia: Present in Two Limbs  8. Sensory Loss: Mild-to-Moderate Sensory Loss  9. Best Language: Mute, Global Aphasia  10. Dysarthria: Severe Dysarthria  11. Extinction and Inattention: Visual, Tactile, Auditory, Spatial, or Personal Inattention  NIH Stroke Scale: 21       Medications  Scheduled:  aspirin, 81 mg, oral, Daily  atorvastatin, 40 mg, oral, Nightly  enoxaparin, 40 mg, subcutaneous, Daily  fluticasone furoate-vilanteroL, 1 puff, inhalation, Daily  folic acid, 1 mg, oral, Daily  gabapentin, 300 mg, oral, q8h MARILU  multivitamin with minerals, 1 tablet, oral, Daily  PARoxetine, 20 mg, oral,  Daily  polyethylene glycol, 17 g, oral, Daily  thiamine, 100 mg, oral, Daily  thiamine, 500 mg, intravenous, q8h MARILU      PRN:  PRN medications: [] hydrALAZINE **FOLLOWED BY** hydrALAZINE, oxygen   Continuous:     Lab Results  Results from last 72 hours   Lab Units 24  1234 24  0842 24   SODIUM mmol/L 143 144 144   POTASSIUM mmol/L 3.5 3.6 3.5   BUN mg/dL 19 17 19   CREATININE mg/dL 1.98* 1.56* 1.77*   CALCIUM mg/dL 9.5 9.1 9.7   MAGNESIUM mg/dL 2.54* 2.32  --       Results from last 72 hours   Lab Units 24  1234 24  0842 24   WBC AUTO x10*3/uL 9.0 9.5 10.0   HEMOGLOBIN g/dL 14.1 13.0* 13.1*   HEMATOCRIT % 44.6 42.7 39.0*   PLATELETS AUTO x10*3/uL 231 198 236      Results from last 72 hours   Lab Units 24   AST U/L 88*   ALT U/L 23   ALK PHOS U/L 94       Results from last 72 hours   Lab Units 24   INR  1.1      Results from last 72 hours   Lab Units 24   HEMOGLOBIN A1C % 6.2*  --    LDL CALC mg/dL  --  110*        Imaging Results  MR brain wo IV contrast    Result Date: 2024  MRI BRAIN: 1. Acute cortical infarct in the medial-most right occipital lobe involving PCA territory. 2. Chronic left PCA territory infarct involving the occipital lobe, parahippocampal gyrus, and dorsal medial left thalamus. 3. Moderate burden of supratentorial and infratentorial chronic small vessel ischemic changes including left cerebellar infarct, central pontine  infarct, supratentorial white matter disease.   MRA HEAD AND NECK: 1. No evidence of major vessel intracranial or extracranial occlusion. 2. Moderate narrowing of the intracranial vertebral artery which is slightly progressed from 2016. 3. Chronically occluded left PCA in keeping with remote left PCA territory infarct.   MACRO: Denita Cox discussed the significance and urgency of this critical finding by telephone with  Dr Lopez on 2024 at  5:05 a.m.. (**-RCF-**) Findings:  See findings.   Signed by: Yefri Thomas 7/23/2024 8:29 AM Dictation workstation:   [id number]     Assessment/Plan   Michael Reed is a 54 y.o. right handed male with history of L PCA stroke 2016 (w/ R HH and right hemiplegia), R PCA stroke 7/2024 both in setting of non-adherent to xarelto, Factor V Leiden (non-adherence to xarelto), HTN, HLD presenting as a BAT after being found down. On exam, he has dense right hemiplegia (worse from prior), severe expressive aphasia. CTH/CTA with L CAREN infarct and L A2 cut off - not a candidate for thrombolysis as out of the window with established hypodensity and on DOAC. His degree of aphasia doesn't fit with the L CAREN infarct seen on CTH. vEEG negative for epileptiform discharges. MRI showing subacute L CAREN infarct and R temporal parietal white matter disease.    Questionable compliance of Xarelto. Ex wife reports compliance however per fill history, last fill in October for 30 day supply.      Updates 12/19/24:  - MRI showing redemonstration of subacute infarct of L CAREN in addition to focal subacute infarct in R temporal parietal white matter.  - Thus far have been unable to reach son/daughter to understand compliance, per wife (who does not live with patient, he has been complaint but will miss a dose every 1-2 weeks)  - If compliant on Xarelto plan to switch to Eliquis, if not no change in AC  - MASON improving, will give 500ml bolus     Stroke Classification:  Type: Ischemic stroke  Subtype/etiology: undetermined - likely embolic   Vessels involved: L CAREN   Neurological manifestations:  Pre-Intervention Deficits: NIHSS 20 (with breakdown)  Pre-stroke mRS: 3  Initial treatment: None  Anti-platelets or Anti-coagulation management: Aspirin  Vascular Risk Factors: Factor V Leiden, HTN/HLD   Antiplatelet/antithrombotic plan for stroke prevention: ASA   VTE prophylaxis: lovenox      #L CAREN infarct   - Stroke work up:        - Utox negative        - , A1C 6.2, BNP 70       - TTE w/ bubble study: EF 40%, impaired relaxation and intrapulmonary shunt       - Consider utility of MRI w/o contrast in AM        - MRI 12/18: redemonstration of subacute infarct of L CAREN in addition to focal subacute infarct in R temporal parietal white matter.  -  vEEG given aphasia on exam to rule out seizure   - L hemispheric lesion, no epileptiform discharges   - EEG discontinued  - MRI pending for stroke burden and aphasia not explained by findings on CTH  - Continue home ASA 81 mg daily - hold off on AC for now given stroke burden.   - If compliant on Xarelto plan to switch to Eliquis, if not no change in AC  - Continue home atorvastatin 40 mg daily   - SLP/PT/OT consult   - Regular diet     #Factor V Leiden   - If compliant on Xarelto plan to switch to Eliquis, if not no change in AC, holding iso stroke burden     #Recent heavy alcohol use   - Unclear last drink   - CIWA protocol, discontinue since more than 3 days  - Thiamine 500 mg IV TID x3 days --> 100 mg daily PO     #HTN  #HLD   - Hold home antihypertensives - amlodipine 10 mg daily, Coreg 3.125 mg BID, Lisinopril 40 mg daily - to allow for permissive HTN sBP < 220 - PRN hydral, labetalol  - Continue atorvastatin 40 mg daily      Vascular Risk Factor modification goals:  Blood pressure goals: avoid hypotension SBP <100 that could worsen cerebral perfusion, Ischemic stroke- early permissive hypertension SBP < 220 mmHg with cautious inpatient lowering  Lipid Goals: education on healthy diet and statin therapy to maintain or achieve goal LDL-cholesterol < 70mg  Glucose Goals: early treatment of hyperglycemia to goal glucose 140-180 mg/dl with long-term goal A1c < 7%   Smoking Cessation and Education  Assessment for Rehabilitation needs   Patient and family education on signs and symptoms of stroke, calling 911, healthy strategies for stroke prevention.       F: PRN (caution - EF 30-35%)  E: PRN, K > 4, Mg > 2, Phos  >3   N: Regular diet  A: PIV    DVT ppx: lovenox    GI ppx: not indicated        Code status: DNR ok for intubation (confirmed on admission with ex-wife)  NOK: Tiffany Reed (ex-wife - not , POA) - [phone]     Felix Hunter MD  PGY-2 Neurology  Stroke Neurology [id number]    Pt seen and discussed with the attending physician, Dr. Colbert.

## 2024-12-19 NOTE — PROGRESS NOTES
Occupational Therapy    OT Treatment    Patient Name: Michael Reed  MRN: [id number]  Department: [place] [id number]  Room: [address] [place] [address]  Today's Date: 12/19/2024  Time Calculation  Start Time: 0944  Stop Time: 0958  Time Calculation (min): 14 min        Assessment:  Medical Staff Made Aware: Yes  End of Session Communication: Bedside nurse  End of Session Patient Position: Up in chair, Alarm on  Medical Staff Made Aware: Yes  Plan:  Treatment Interventions: ADL retraining, UE strengthening/ROM, Endurance training, Cognitive reorientation, Neuromuscular reeducation, Fine motor coordination activities  OT Frequency: 4 times per week  OT Discharge Recommendations: High intensity level of continued care  OT Recommended Transfer Status: Maximum assist, Assist of 2  OT - OK to Discharge: Yes  Treatment Interventions: ADL retraining, UE strengthening/ROM, Endurance training, Cognitive reorientation, Neuromuscular reeducation, Fine motor coordination activities    Subjective   Previous Visit Info:  OT Last Visit  OT Received On: 12/19/24  General:  General  Reason for Referral: presenting as a BAT after being found down.  Past Medical History Relevant to Rehab: L PCA stroke 2016 (w/ R HH and right hemiplegia), R PCA stroke 7/2024 both in setting of non-adherent to xarelto, Factor V Leiden (non-adherence to xarelto), HTN, HLD  Co-Treatment: PT  Co-Treatment Reason: to maximize mobility and safety  Prior to Session Communication: Bedside nurse  Patient Position Received: Up in chair, Alarm on  Preferred Learning Style: verbal  Precautions:  Medical Precautions: Fall precautions            Pain:  Pain Assessment  Pain Assessment: 0-10  0-10 (Numeric) Pain Score: 0 - No pain    Objective    Cognition:  Cognition  Overall Cognitive Status: Impaired  Safety/Judgement: Exceptions to WFL  Complex Functional Tasks: Moderate     Activities of Daily Living:      Grooming  Grooming Level of Assistance: Setup, Visual aid cues, Minimal verbal  cues  Grooming Comments: oral hygiene, and facial hygiene in seated position.    UE Bathing  UE Bathing Level of Assistance: Setup, Minimal verbal cues, Tactile cues    LE Dressing  LE Dressing: Yes    Toileting  Toileting Level of Assistance: Maximum assistance, Moderate verbal cues, Visual aid cues  Where Assessed:  (initiated in seated position and completed in standing.)    Bed Mobility/Transfers: Bed Mobility  Bed Mobility: No    Transfers  Transfer: Yes  Transfer 1  Technique 1: Sit to stand, Stand to sit  Transfer Device 1: Kris-walker  Transfer Level of Assistance 1: Moderate assistance, +2, Moderate verbal cues  Trials/Comments 1: 2    Sitting Balance:     Standing Balance:  Dynamic Standing Balance  Dynamic Standing-Comments: Pt required min assist x 2 to complete short distance ambulation using a hemicane.      Outcome Measures:[facility] Daily Activity  Putting on and taking off regular lower body clothing: A lot  Bathing (including washing, rinsing, drying): A lot  Putting on and taking off regular upper body clothing: A lot  Toileting, which includes using toilet, bedpan or urinal: A lot  Taking care of personal grooming such as brushing teeth: A little  Eating Meals: A little  Daily Activity - Total Score: 14        Education Documentation  Body Mechanics, taught by Ariel Barfield OT at 12/19/2024  2:05 PM.  Learner: Patient  Readiness: Acceptance  Method: Explanation  Response: Needs Reinforcement    Home Exercise Program, taught by Ariel Barfield OT at 12/19/2024  2:05 PM.  Learner: Patient  Readiness: Acceptance  Method: Explanation  Response: Needs Reinforcement    ADL Training, taught by Ariel Barfield OT at 12/19/2024  2:05 PM.  Learner: Patient  Readiness: Acceptance  Method: Explanation  Response: Needs Reinforcement    Education Comments  No comments found.          Goals:  Encounter Problems       Encounter Problems (Active)       ADLs       Patient will perform UB and LB bathing  with  moderate assist level of assistance and grab bars. (Progressing)       Start:  12/17/24    Expected End:  01/07/25            Patient with complete upper body dressing with minimal assist  level of assistance donning and doffing all UE clothes with PRN adaptive equipment while edge of bed  (Progressing)       Start:  12/17/24    Expected End:  01/07/25            Patient with complete lower body dressing with minimal assist  level of assistance donning and doffing all LE clothes  with PRN adaptive equipment while edge of bed  (Progressing)       Start:  12/17/24    Expected End:  01/07/25            Patient will complete daily grooming tasks washing face/hair with stand by assist level of assistance and PRN adaptive equipment while edge of bed . (Progressing)       Start:  12/17/24    Expected End:  01/07/25            Patient will complete toileting including hygiene clothing management/hygiene with minimal assist  level of assistance and grab bars. (Progressing)       Start:  12/17/24    Expected End:  01/07/25               BALANCE       Pt will maintain dynamic standing balance during ADL task with moderate assist level of assistance in order to demonstrate decreased risk of falling and improved postural control. (Progressing)       Start:  12/17/24    Expected End:  01/07/25               COGNITION/SAFETY       Pt will follow One  steps verbal cues 50% of the time during OT tx session. (Progressing)       Start:  12/17/24    Expected End:  01/07/25               EXERCISE/STRENGTHENING       Patient will complete RUE exercises for 15 reps in order to improve strength and activity for ADL performance.  (Progressing)       Start:  12/17/24    Expected End:  01/07/25               MOBILITY       Patient will perform Functional mobility min Household distances/Community Distances with minimal assist  level of assistance and least restrictive device in order to improve safety and functional mobility. (Progressing)        Start:  12/17/24    Expected End:  01/07/25               TRANSFERS       Patient will perform bed mobility minimal assist  level of assistance and bed rails in order to improve safety and independence with mobility (Progressing)       Start:  12/17/24    Expected End:  01/07/25            Patient will complete sit to stand transfer with minimal assist  level of assistance and least restrictive device in order to improve safety and prepare for out of bed mobility. (Progressing)       Start:  12/17/24    Expected End:  01/07/25                     Ariel RODRIGUEZ/TIAGO, OTD

## 2024-12-19 NOTE — CARE PLAN
Problem: Skin  Goal: Promote/optimize nutrition  Outcome: Progressing  Flowsheets (Taken 12/19/2024 1742)  Promote/optimize nutrition:   Consume > 50% meals/supplements   Offer water/supplements/favorite foods  Goal: Promote skin healing  Outcome: Progressing  Flowsheets (Taken 12/19/2024 1742)  Promote skin healing: Assess skin/pad under line(s)/device(s)     Problem: Fall/Injury  Goal: Be free from injury by end of the shift  Outcome: Progressing    The clinical goals for the shift include Pt will remain safe and free of falls throughout the shift.    Over the shift, the patient was free from injury throughout the day. Pt is consuming 100% of his meals and drinking plenty of fluids. Pt is currently sitting at the edge of the bed and the call light is within reach.

## 2024-12-20 VITALS
HEART RATE: 86 BPM | WEIGHT: 255.07 LBS | SYSTOLIC BLOOD PRESSURE: 131 MMHG | OXYGEN SATURATION: 93 % | TEMPERATURE: 95.9 F | RESPIRATION RATE: 14 BRPM | DIASTOLIC BLOOD PRESSURE: 86 MMHG | BODY MASS INDEX: 33.65 KG/M2

## 2024-12-20 PROBLEM — F10.10 ALCOHOL ABUSE: Chronic | Status: ACTIVE | Noted: 2024-12-20

## 2024-12-20 LAB
ALBUMIN SERPL BCP-MCNC: 3.8 G/DL (ref 3.4–5)
ANION GAP SERPL CALC-SCNC: 13 MMOL/L (ref 10–20)
BUN SERPL-MCNC: 13 MG/DL (ref 6–23)
CALCIUM SERPL-MCNC: 9.1 MG/DL (ref 8.6–10.6)
CHLORIDE SERPL-SCNC: 106 MMOL/L (ref 98–107)
CO2 SERPL-SCNC: 28 MMOL/L (ref 21–32)
CREAT SERPL-MCNC: 1.56 MG/DL (ref 0.5–1.3)
EGFRCR SERPLBLD CKD-EPI 2021: 52 ML/MIN/1.73M*2
GLUCOSE BLD MANUAL STRIP-MCNC: 103 MG/DL (ref 74–99)
GLUCOSE BLD MANUAL STRIP-MCNC: 127 MG/DL (ref 74–99)
GLUCOSE BLD MANUAL STRIP-MCNC: 146 MG/DL (ref 74–99)
GLUCOSE SERPL-MCNC: 140 MG/DL (ref 74–99)
PHOSPHATE SERPL-MCNC: 2.8 MG/DL (ref 2.5–4.9)
POTASSIUM SERPL-SCNC: 3.8 MMOL/L (ref 3.5–5.3)
SODIUM SERPL-SCNC: 143 MMOL/L (ref 136–145)

## 2024-12-20 PROCEDURE — 94760 N-INVAS EAR/PLS OXIMETRY 1: CPT

## 2024-12-20 PROCEDURE — 2500000001 HC RX 250 WO HCPCS SELF ADMINISTERED DRUGS (ALT 637 FOR MEDICARE OP)

## 2024-12-20 PROCEDURE — 2500000004 HC RX 250 GENERAL PHARMACY W/ HCPCS (ALT 636 FOR OP/ED)

## 2024-12-20 PROCEDURE — 82947 ASSAY GLUCOSE BLOOD QUANT: CPT

## 2024-12-20 PROCEDURE — 84100 ASSAY OF PHOSPHORUS: CPT

## 2024-12-20 PROCEDURE — 2500000002 HC RX 250 W HCPCS SELF ADMINISTERED DRUGS (ALT 637 FOR MEDICARE OP, ALT 636 FOR OP/ED)

## 2024-12-20 PROCEDURE — 99238 HOSP IP/OBS DSCHRG MGMT 30/<: CPT

## 2024-12-20 PROCEDURE — 97530 THERAPEUTIC ACTIVITIES: CPT | Mod: GP

## 2024-12-20 PROCEDURE — 97535 SELF CARE MNGMENT TRAINING: CPT | Mod: GO

## 2024-12-20 PROCEDURE — 36415 COLL VENOUS BLD VENIPUNCTURE: CPT

## 2024-12-20 RX ORDER — POLYETHYLENE GLYCOL 3350 17 G/17G
17 POWDER, FOR SOLUTION ORAL DAILY PRN
Start: 2024-12-20

## 2024-12-20 RX ORDER — CARVEDILOL 3.12 MG/1
3.12 TABLET ORAL 2 TIMES DAILY
Status: DISCONTINUED | OUTPATIENT
Start: 2024-12-20 | End: 2024-12-20 | Stop reason: HOSPADM

## 2024-12-20 RX ORDER — FOLIC ACID 1 MG/1
1 TABLET ORAL DAILY
Start: 2024-12-21

## 2024-12-20 RX ORDER — MULTIVIT-MIN/IRON FUM/FOLIC AC 7.5 MG-4
1 TABLET ORAL DAILY
Start: 2024-12-21

## 2024-12-20 RX ORDER — LANOLIN ALCOHOL/MO/W.PET/CERES
100 CREAM (GRAM) TOPICAL DAILY
Start: 2024-12-21

## 2024-12-20 RX ORDER — ATORVASTATIN CALCIUM 40 MG/1
80 TABLET, FILM COATED ORAL NIGHTLY
Start: 2024-12-20

## 2024-12-20 RX ADMIN — APIXABAN 5 MG: 5 TABLET, FILM COATED ORAL at 10:27

## 2024-12-20 RX ADMIN — Medication 1 TABLET: at 08:03

## 2024-12-20 RX ADMIN — POLYETHYLENE GLYCOL 3350 17 G: 17 POWDER, FOR SOLUTION ORAL at 08:03

## 2024-12-20 RX ADMIN — THIAMINE HCL TAB 100 MG 100 MG: 100 TAB at 08:03

## 2024-12-20 RX ADMIN — GABAPENTIN 300 MG: 300 CAPSULE ORAL at 13:47

## 2024-12-20 RX ADMIN — GABAPENTIN 300 MG: 300 CAPSULE ORAL at 06:18

## 2024-12-20 RX ADMIN — ASPIRIN 81 MG: 81 TABLET, CHEWABLE ORAL at 06:18

## 2024-12-20 RX ADMIN — FLUTICASONE FUROATE AND VILANTEROL TRIFENATATE 1 PUFF: 200; 25 POWDER RESPIRATORY (INHALATION) at 08:06

## 2024-12-20 RX ADMIN — FOLIC ACID 1 MG: 1 TABLET ORAL at 08:03

## 2024-12-20 RX ADMIN — AMLODIPINE BESYLATE 10 MG: 10 TABLET ORAL at 08:03

## 2024-12-20 RX ADMIN — PAROXETINE 20 MG: 20 TABLET, FILM COATED ORAL at 08:03

## 2024-12-20 RX ADMIN — CARVEDILOL 3.12 MG: 3.12 TABLET, FILM COATED ORAL at 10:27

## 2024-12-20 ASSESSMENT — COGNITIVE AND FUNCTIONAL STATUS - GENERAL
MOBILITY SCORE: 13
TOILETING: A LOT
WALKING IN HOSPITAL ROOM: A LOT
MOVING FROM LYING ON BACK TO SITTING ON SIDE OF FLAT BED WITH BEDRAILS: A LITTLE
DRESSING REGULAR LOWER BODY CLOTHING: A LOT
DRESSING REGULAR UPPER BODY CLOTHING: A LOT
MOVING TO AND FROM BED TO CHAIR: A LOT
PERSONAL GROOMING: A LITTLE
CLIMB 3 TO 5 STEPS WITH RAILING: TOTAL
DAILY ACTIVITIY SCORE: 14
TURNING FROM BACK TO SIDE WHILE IN FLAT BAD: A LITTLE
MOVING TO AND FROM BED TO CHAIR: A LOT
EATING MEALS: A LITTLE
DRESSING REGULAR LOWER BODY CLOTHING: A LOT
DAILY ACTIVITIY SCORE: 14
TOILETING: A LOT
CLIMB 3 TO 5 STEPS WITH RAILING: TOTAL
WALKING IN HOSPITAL ROOM: A LOT
HELP NEEDED FOR BATHING: A LOT
STANDING UP FROM CHAIR USING ARMS: A LITTLE
STANDING UP FROM CHAIR USING ARMS: A LOT
HELP NEEDED FOR BATHING: A LOT
DRESSING REGULAR UPPER BODY CLOTHING: A LOT
MOBILITY SCORE: 14
EATING MEALS: A LITTLE
TURNING FROM BACK TO SIDE WHILE IN FLAT BAD: A LITTLE
MOVING FROM LYING ON BACK TO SITTING ON SIDE OF FLAT BED WITH BEDRAILS: A LITTLE
PERSONAL GROOMING: A LITTLE

## 2024-12-20 ASSESSMENT — PAIN - FUNCTIONAL ASSESSMENT
PAIN_FUNCTIONAL_ASSESSMENT: 0-10
PAIN_FUNCTIONAL_ASSESSMENT: 0-10

## 2024-12-20 ASSESSMENT — PAIN SCALES - GENERAL
PAINLEVEL_OUTOF10: 0 - NO PAIN
PAINLEVEL_OUTOF10: 0 - NO PAIN

## 2024-12-20 ASSESSMENT — ACTIVITIES OF DAILY LIVING (ADL): HOME_MANAGEMENT_TIME_ENTRY: 13

## 2024-12-20 NOTE — PROGRESS NOTES
Per medical team, patient medically ready to discharge. Transport confirmed for 4pm via [facility] going to [facility]. Report: [phone] ask for LILO Shanks. Nurse, patient's wife, medical team, and SNF aware of and in agreement to discharge plan. Blue sheet provided to nursing unit. Justine Gonzalez RN, TCC

## 2024-12-20 NOTE — DISCHARGE INSTRUCTIONS
"Dear Mr. Reed,    It was a pleasure caring for you! You were admitted to [facility] ([facility]) on 12/16/24 for stroke  This would explain your weakness on the right side as well as your difficulty with language. The cause of your stroke is suspected to be due to your factor V leiden. After speaking with your family, it seems that you were taking your blood thinner (xarelto) as prescribed, so we would consider this a \"xarelto failure\" meaning that we will need to switch you to a different medication. This was switched to Apixaban (Eliquis). Your atorvastatin was also increased to address your cholesterol.   We are slowly reintroducing your blood pressure medications to make sure we do not drop your blood pressure too quickly, which is especially important after a stroke. Continue take your coreg and amlodipine. Check your blood pressure regularly, and if your pressure remains above 140/90, restart your lisinopril.     During your stay, you were also evaluated with EEG to make sure your symptoms on presentation were not attributable to a seizure. No seizures were detected, but there were some findings suggestive that you may be at slightly higher seizure risk. This is not unexpected after a stroke. Since you did not have a seizure, we will not start you on an anti seizure medication at this time. However, it is every important that you stop drinking alcohol both to address your stroke risk, but now especially to address your seizure risk. We have prescribed some vitamins to treat any nutritional deficiencies that are often seen with frequent alcohol use.     You will be discharged to a skilled nursing facility for further care and rehab in order to get stronger.    For you to do:  [ ] Take all of your medications as prescribed.  [ ] Follow-up with your PCP* on within two weeks of hospital discharge to discuss blood pressure control   [ ] Please follow-up with Stroke Neurology    *PCP is " a ‘Primary Care Provider’ and refers to your 1) Internal Medicine Doctor (‘internist’); 2) Family Medicine Doctor; or 3) Non-Physician Provider like a Nurse Practitioner (NP), Physician Assistant (PA), or Certified Nurse Specialist (CNS). If you have not already established care with a PCP or would like to switch to a  provider, our  service can help you find one. They can be reached at [phone].    Thank you for letting us take part in your care.    [facility] Inpatient Neurology Service - Stroke and Vascular Neurology Team

## 2024-12-20 NOTE — PROGRESS NOTES
Physical Therapy    Physical Therapy Treatment    Patient Name: Michael Reed  MRN: [id number]  Today's Date: 12/20/2024  Time Calculation  Start Time: 1006  Stop Time: 1019  Time Calculation (min): 13 min       Assessment/Plan   PT Assessment  End of Session Communication: Bedside nurse  End of Session Patient Position: Up in chair, Alarm on     PT Plan  Treatment/Interventions: Bed mobility, Transfer training, Balance training, Strengthening, Therapeutic exercise, Neuromuscular re-education  PT Plan: Ongoing PT  PT Frequency: 4 times per week  PT Discharge Recommendations: High intensity level of continued care  PT Recommended Transfer Status: Assist x2  PT - OK to Discharge: Yes      General Visit Information:   PT  Visit  PT Received On: 12/20/24  Co-Treatment: OT  Co-Treatment Reason: to maximize mobility and safety  Prior to Session Communication: Bedside nurse  Patient Position Received: Bed, 3 rail up, Alarm on     Subjective   Precautions:  Precautions  Medical Precautions: Fall precautions       Objective   Pain:  Pain Assessment  Pain Assessment:  (no verbal or non-verbal signs of pain)       Postural Control:     Static Sitting Balance  Static Sitting-Level of Assistance: Contact guard  Dynamic Sitting Balance  Dynamic Sitting-Level of Assistance:  (CGA/min assist)  Static Standing Balance  Static Standing-Balance Support: Left upper extremity supported  Static Standing-Level of Assistance: Minimum assistance (x2)  Static Standing-Comment/Number of Minutes: stood for ~2 minutes to use restroom  Dynamic Standing Balance  Dynamic Standing-Balance Support: Left upper extremity supported  Dynamic Standing-Level of Assistance:  (min/mod assist x2)      PT Treatments:           Bed Mobility  Bed Mobility: Yes  Bed Mobility 1  Bed Mobility 1: Supine to sitting  Level of Assistance 1: Contact guard  Bed Mobility Comments 1: HOB elevated, verbal cues  Ambulation/Gait Training  Ambulation/Gait Training Performed:  Yes  Ambulation/Gait Training 1  Device 1: Kris Walker  Assistance 1: Minimum assistance (x2)  Quality of Gait 1: Diminished heel strike, Decreased step length, Inconsistent stride length (right foot drag, verbal cues for safety, 2 LOB with mod assist to correct)  Comments/Distance (ft) 1: 10 feet x2  Transfers  Transfer: Yes  Transfer 1  Technique 1: Sit to stand, Stand to sit  Transfer Device 1: Kris-walker  Transfer Level of Assistance 1: Minimum assistance, +2  Trials/Comments 1: verbal cues for safety             Outcome Measures:  [facility] Basic Mobility  Turning from your back to your side while in a flat bed without using bedrails: A little  Moving from lying on your back to sitting on the side of a flat bed without using bedrails: A little  Moving to and from bed to chair (including a wheelchair): A lot  Standing up from a chair using your arms (e.g. wheelchair or bedside chair): A lot  To walk in hospital room: A lot  Climbing 3-5 steps with railing: Total  Basic Mobility - Total Score: 13                            Education Documentation  Mobility Training, taught by Brittany Arzate, PT at 12/20/2024 11:57 AM.  Learner: Patient  Readiness: Acceptance  Method: Explanation  Response: Needs Reinforcement    Education Comments  No comments found.               Encounter Problems       Encounter Problems (Active)       Balance       supervision static/dynamic sitting  (Progressing)       Start:  12/17/24    Expected End:  01/07/25               Balance       CGA static/dynamic stance with UE (Progressing)       Start:  12/18/24    Expected End:  01/07/25               Mobility       Pt will actively participate in BLE ther-ex in order to improve BLE strength and to assist with the completion of functional mobility tasks.  (Progressing)       Start:  12/17/24    Expected End:  01/07/25               Mobility       STG - Patient will ambulate 50 feet with LRD with min assist (Progressing)       Start:  12/18/24     Expected End:  01/07/25               PT Transfers       STG - Transfer from bed to chair with LRD with mod assist x2 (Met)       Start:  12/17/24    Expected End:  01/07/25    Resolved:  12/18/24    Updated to: STG - Transfer from bed to chair with LRD with min assist    Update reason: pt progress         STG - Patient will perform bed mobility with mod assist (Met)       Start:  12/17/24    Expected End:  01/07/25    Resolved:  12/18/24    Updated to: STG - Patient will perform bed mobility with CGA    Update reason: pt progress         STG - Patient will transfer sit to and from stand with LRD with mod assist x2 (Met)       Start:  12/17/24    Expected End:  01/07/25    Resolved:  12/18/24    Updated to: STG - Patient will transfer sit to and from stand with LRD with CGA         STG - Transfer from bed to chair with LRD with min assist (Progressing)       Start:  12/18/24    Expected End:  01/07/25                STG - Patient will perform bed mobility with CGA (Progressing)       Start:  12/18/24    Expected End:  01/07/25                STG - Patient will transfer sit to and from stand with LRD with CGA (Progressing)       Start:  12/18/24    Expected End:  01/07/25                   Pain - Adult                12/20/24 at 11:58 AM   Brittany Arzate, PT

## 2024-12-20 NOTE — NURSING NOTE
This nurse has contacted [facility] multiple times with no response. Each call rings and goes straight to voicemail.     Left 2 voice messages with no call back.

## 2024-12-20 NOTE — HOSPITAL COURSE
Michael Reed is a 54 y.o. right handed male with history of L PCA stroke 2016 (w/ R HH and right hemiplegia), R PCA stroke 7/2024 both in setting of non-adherent to xarelto, Factor V Leiden (non-adherence to xarelto), HTN, HLD presenting as a BAT after being found down. On exam, he has dense right hemiplegia (worse from prior), severe expressive aphasia. CTH/CTA with L CAREN infarct and L A2 cut off - not a candidate for thrombolysis as out of the window with established hypodensity and on DOAC. His degree of aphasia doesn't fit with the L CAREN infarct seen on CTH. vEEG negative for epileptiform discharges. MRI showing subacute L CAREN infarct and R temporal parietal white matter disease.     Questionable compliance of Xarelto. Ex wife reports compliance however per fill history, last fill in October for 30 day supply. Given patient compliant on Xarelto as of late, this is considered Xarelto failure. Therefore, pt was switched to Eliquis 5mg BID.     EEG placed upon admission to r/o epileptiform activity. It did show intermittent spikes, however no seizures. Pt was not started on ASM given no clinical correlation and attribution to acute symptomatic event. Was monitored clinically. Pt medically stable and subsequently discharged to SNF.

## 2024-12-20 NOTE — DISCHARGE SUMMARY
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 male who presented to the hospital with L CAREN infarction. They were diagnosed with a stroke.  Etiology: Ischemic Stroke: Stroke of other determined etiology    Relevant hospital complications: None  Discharge antithrombotics: ASA 81, Eliquis 5mg BID  Issues Requiring Follow-Up  -Stroke Neurology post hospital fu  -Fu with PCP regarding BP medications  -Consider future EEG given spikes found during admission, not started on ASM    MR brain wo IV contrast    Result Date: 12/19/2024  Interpreted By:  Sandra Dunn,  and Krystina Arias STUDY: MR BRAIN WO IV CONTRAST;  12/19/2024 2:50 am   INDICATION: Signs/Symptoms:follow up stroke burden.   COMPARISON: CT head 12/16/2024 MRI brain July 23, 2024.   ACCESSION NUMBER(S): [id number]   ORDERING CLINICIAN: PEPITO OLEA   TECHNIQUE: The brain was studied in the sagittal, axial and coronal planes utilizing FLAIR, T1 and T2 weighted images. Axial diffusion weighted imaging was obtained.   FINDINGS: There is abnormal increased signal on diffusion-weighted imaging with corresponding diminished signal on the ADC map involving the parasagittal left frontal lobe and extending into the anterior parietal lobe. There is involvement of the corpus callosum. There are additional scattered punctate foci within the frontal and parietal lobes. There is local mass effect including sulcal effacement and partial effacement of the left lateral ventricle. There is 0.5 cm midline shift to the right.   There is a small focus of increased signal on diffusion-weighted imaging with corresponding diminished signal on the ADC map in the right periatrial white matter. There is no significant associated mass effect.   There are again areas of encephalomalacia and gliosis in the  left occipital lobe, posteromedial left temporal lobe, left cerebellum, and left thalamus. There is susceptibility artifact on gradient echo imaging lining the previously noted areas of encephalomalacia/gliosis likely reflecting a combination of mineralization and hemosiderin deposition.   There are nonspecific hyperintensities on FLAIR and T2 weighted imaging in the subcortical and periventricular white matter. There is a small focus of encephalomalacia and gliosis in the lateral right frontal lobe. There are small signal abnormalities within the brainstem and left middle cerebellar peduncle which could reflect remote lacunar infarcts. There is subtle loss of volume of the left cerebral peduncle and left carolin potentially reflecting a component of wallerian degeneration.   Ex vacuo dilatation of the left lateral ventricle. The ventricular system is otherwise nondilated.   There is a retention cyst or polyp in the posteromedial right maxillary sinus. There is opacification of a few left-sided mastoid air cells.   IMPRESSION 1. Redemonstration of a subacute infarct in the distribution of the left anterior cerebral artery causing effacement of the adjacent sulci, anterior left lateral ventricle, and midline shift to the right. 2. Focal subacute infarct in the right temporal periatrial white matter. 3. Stable areas of encephalomalacia and gliosis as described above. 4. Nonspecific white matter changes may be seen with small-vessel ischemic disease, vasculitis, among others.   I personally reviewed the images/study and I agree with the findings as stated.   MACRO: Juana Flaherty discussed the significance and urgency of this critical finding by secure chat with  EDISON RICE on 12/19/2024 at 3:34 am.  (**-RCF-**) Findings:  See findings.   Signed by: Sandra Dunn 12/19/2024 7:32 AM Dictation workstation:   [id number]   CT brain attack head wo IV contrast    Result Date: 12/16/2024  Interpreted By:   López Arellano  and John Paul Delgado STUDY: CT BRAIN ATTACK HEAD WO IV CONTRAST; CT CERVICAL SPINE WO IV CONTRAST;  12/16/2024 7:31 pm; 12/16/2024 7:44 pm   INDICATION: Signs/Symptoms:Stroke Evaluation; Signs/Symptoms:ams, fall?   COMPARISON: MRI brain 07/23/2024, CT head 06/06/2017.   ACCESSION NUMBER(S): [id number]; [id number]   ORDERING CLINICIAN: GAMA ORTEGA   TECHNIQUE: Axial noncontrast CT images of head with coronal and sagittal reconstructed images. Axial noncontrast CT images of the cervical spine with coronal and sagittal reconstructed images.   FINDINGS: CT HEAD:   BRAIN PARENCHYMA: Loss of gray-white differentiation within the medial left frontal lobe in the distribution of the left anterior cerebral artery consistent with acute to subacute infarct. There is mild mass effect without evidence for midline shift. Stable areas of encephalomalacia and gliosis within the medial left occipital lobe, parts of the thalamus, hippocampus, and left cerebellum. Moderate deep and periventricular white matter hypodensities are nonspecific, but favored to represent chronic small vessel ischemic changes.   VENTRICLES and EXTRA-AXIAL SPACES: No acute extra-axial or intraventricular hemorrhage. Mild effacement of the sulci of the left frontal lobe. Mild ex vacuo dilation of the left lateral ventricle adjacent to an area of encephalomalacia and gliosis within the left occipitoparietal lobe.   PARANASAL SINUSES/MASTOIDS: Mucosal retention cysts within the right maxillary sinus. The paranasal sinuses otherwise clear. The mastoids are well aerated.   CALVARIUM/ORBITS: No skull fracture. The orbits and globes are intact to the extent visualized.   EXTRACRANIAL SOFT TISSUES: No discernible hematoma.     CT CERVICAL SPINE:   PREVERTEBRAL SOFT TISSUES: Within normal limits.   CRANIOCERVICAL JUNCTION: Intact.   ALIGNMENT: No traumatic malalignment or traumatic facet widening. Mild straightening of the normal lordotic curve could  be secondary to patient positioning and/or spasm.   VERTEBRAE: No acute fracture. Vertebral body heights are maintained.   SPINAL CANAL/INTERVERTEBRAL DISCS: No high-grade spinal canal stenosis. Mild loss of disc height and anterior osteophyte formation at several levels, worse at C5-6 and C6-7. No critical canal stenosis.   NEURAL FORAMINA:  Facet and uncovertebral hypertrophic changes noted with mild bilateral neural foraminal narrowing at multiple levels   OTHER: The visualized upper lung fields are clear.       CT HEAD: 1. Loss of gray-white differentiation within the medial left frontal lobe consistent with acute to subacute infarction in the distribution of the left anterior cerebral artery . Mild effacement of the adjacent sulci. No evidence of acute intracranial hemorrhage. Consider further evaluation with dedicated MRI. 2. Stable encephalomalacia and gliosis as described above.     CT CERVICAL SPINE: 1. No acute fracture or traumatic malalignment of the cervical spine. 2. Mild multilevel degenerative changes.   I personally reviewed the images/study and resident's interpretation and I agree with the findings as stated by Sandy Coker MD (resident radiologist). This study was analyzed and interpreted at [facility], [place], [place].   MACRO: Juana Flaherty discussed the significance and urgency of this critical finding at the CT scanner with  Sharona Silvestre MD on 12/16/2024 at 7:30 PM.  (**-RCF-**) Findings:  See findings.   Signed by: López Arellano 12/16/2024 8:27 PM Dictation workstation:   [id number]   Transthoracic Echo (TTE) Complete    Result Date: 12/17/2024   [facility], [address] [place], [place] [address]                Tel [phone] and Fax [phone] TRANSTHORACIC ECHOCARDIOGRAM REPORT  Patient Name:       SANTO Thompson Physician:    [id number] Deepti Miller MD Study  Date:         12/17/2024          Ordering Provider:    [id number] PEPITO CHAPMANBLESSING MRN/PID:            [id number]            Fellow: Accession#:         [id number]        Nurse:                Nelida Silva RN Date of Birth/Age:  1970 / 54 years Sonographer:          Matias Wall                                                               RDCS, RVT Gender assigned at                     Additional Staff: Birth: Height:             185.42 cm           Admit Date:           12/16/2024 Weight:             115.67 kg           Admission Status:     Inpatient -                                                               Priority                                                               discharge BSA / BMI:          2.39 m2 / 33.64     Encounter#:           [id number]                     kg/m2 Blood Pressure:     167/110 mmHg        Department Location:  [facility]                                                               Non Invasive Study Type:    TRANSTHORACIC ECHO (TTE) COMPLETE Diagnosis/ICD: Cerebral infarction due to embolism of left anterior cerebral                artery-I63.422 Indication:    stroek work up - with bubble study CPT Code:      Echo Complete w Full Doppler-[id number] Patient History: Pertinent History: Stroke, HTN, PE. Study Detail: The following Echo studies were performed: 2D, M-Mode, Doppler and               color flow. Technically challenging study due to body habitus.               Definity used as a contrast agent for endocardial border               definition and agitated saline used as a contrast agent for               intraseptal flow evaluation. Total contrast used for this               procedure was 3 mL via IV push.  PHYSICIAN INTERPRETATION: Left Ventricle: Left ventricular ejection fraction is moderately decreased, by visual estimate at  40%. There is global hypokinesis of the left ventricle with minor regional variations. The left ventricular cavity size is mildly dilated. There is mildly increased septal and mildly increased posterior left ventricular wall thickness. There is left ventricular concentric remodeling. Spectral Doppler shows a Grade I (impaired relaxation pattern) of left ventricular diastolic filling with normal left atrial filling pressure. Left Atrium: The left atrium is normal in size. A bubble study using agitated saline was performed. There is a delayed appearance of saline contrast bubbles noted in the left atrium, which is indicative of intrapulmonary shunting. Agitated saline contrast study is negative for intracardiac shunt, though late appearance of contrast on the left side is indicative of intraapulmonary shunt. Right Ventricle: The right ventricle is normal in size. There is normal right ventricular global systolic function. Right Atrium: The right atrium is normal in size. Aortic Valve: The aortic valve was not well visualized. The aortic valve dimensionless index is 0.66. There is no evidence of aortic valve regurgitation. The peak instantaneous gradient of the aortic valve is 6 mmHg. The mean gradient of the aortic valve is 3 mmHg. Mitral Valve: The mitral valve is normal in structure. There is trace mitral valve regurgitation. Tricuspid Valve: The tricuspid valve was not well visualized. There is trace tricuspid regurgitation. Pulmonic Valve: The pulmonic valve is not well visualized. The pulmonic valve regurgitation was not well visualized. Pericardium: Trivial pericardial effusion. Aorta: The aortic root is abnormal. The aortic root appears mildly dilated and measures 3.90 cm. There is mild dilatation of the ascending aorta. There is mild dilatation of the aortic root. Systemic Veins: The inferior vena cava appears small in size, with IVC inspiratory collapse greater than 50%. In comparison to the previous  echocardiogram(s): Compared with the prior exam from 7/23/2024, there has been some improvement in the LV systolic function from LVEF 0f 33% to 40%( still global) and the LV size though still dilated is now only mildly dilated reather than moderately.  CONCLUSIONS:  1. Left ventricular ejection fraction is moderately decreased, by visual estimate at 40%.  2. There is global hypokinesis of the left ventricle with minor regional variations.  3. Spectral Doppler shows a Grade I (impaired relaxation pattern) of left ventricular diastolic filling with normal left atrial filling pressure.  4. Left ventricular cavity size is mildly dilated.  5. There is normal right ventricular global systolic function.  6. Agitated saline contrast study is negative for intracardiac shunt, though late appearance of contrast on the left side is indicative of intraapulmonary shunt.  7. Mildly dilated aortic root.  8. Compared with the prior exam from 7/23/2024, there has been some improvement in the LV systolic function from LVEF 0f 33% to 40%( still global) and the LV size though still dilated is now only mildly dilated reather than moderately.  9. A bubble study shows that an intrapulmonary shunting is present. QUANTITATIVE DATA SUMMARY:  2D MEASUREMENTS:          Normal Ranges: Ao Root d:       3.90 cm  (2.0-3.7cm) LAs:             3.40 cm  (2.7-4.0cm) IVSd:            1.10 cm  (0.6-1.1cm) LVPWd:           1.10 cm  (0.6-1.1cm) LVIDd:           5.80 cm  (3.9-5.9cm) LVIDs:           5.00 cm LV Mass Index:   111 g/m2 LVEDV Index:     76 ml/m2 LV % FS          13.8 %  LA VOLUME:                    Normal Ranges: LA Vol A4C:        31.7 ml    (22+/-6mL/m2) LA Vol A2C:        45.5 ml LA Vol BP:         40.1 ml LA Vol Index A4C:  13.3ml/m2 LA Vol Index A2C:  19.1 ml/m2 LA Vol Index BP:   16.8 ml/m2 LA Area A4C:       12.8 cm2 LA Area A2C:       16.2 cm2 LA Major Axis A4C: 4.4 cm LA Major Axis A2C: 4.9 cm  RA VOLUME BY A/L METHOD:         Normal  Ranges: RA Area A4C:             9.7 cm2  AORTA MEASUREMENTS:         Normal Ranges: Ao Sinus, d:        3.90 cm (2.1-3.5cm) Asc Ao, d:          3.50 cm (2.1-3.4cm)  LV SYSTOLIC FUNCTION BY 2D PLANIMETRY (MOD):                      Normal Ranges: EF-A4C View:    37 % (>=55%) EF-A2C View:    45 % EF-Biplane:     43 % EF-Visual:      40 % LV EF Reported: 40 %  LV DIASTOLIC FUNCTION:            Normal Ranges: MV Peak E:             0.48 m/s   (0.7-1.2 m/s) MV Peak A:             0.84 m/s   (0.42-0.7 m/s) E/A Ratio:             0.57       (1.0-2.2) MV e'                  0.047 m/s  (>8.0) MV lateral e'          0.04 m/s MV medial e'           0.05 m/s E/e' Ratio:            10.21      (<8.0) PulmV Sys Johnathon:         43.20 cm/s PulmV Crisostomo Johnathon:        30.80 cm/s PulmV S/D Johnathon:         1.40  AORTIC VALVE:                     Normal Ranges: AoV Vmax:                1.19 m/s (<=1.7m/s) AoV Peak P.7 mmHg (<20mmHg) AoV Mean PG:             3.0 mmHg (1.7-11.5mmHg) LVOT Max Johnathon:            0.87 m/s (<=1.1m/s) AoV VTI:                 20.10 cm (18-25cm) LVOT VTI:                13.30 cm LVOT Diameter:           2.40 cm  (1.8-2.4cm) AoV Area, VTI:           2.99 cm2 (2.5-5.5cm2) AoV Area,Vmax:           3.30 cm2 (2.5-4.5cm2) AoV Dimensionless Index: 0.66  RIGHT VENTRICLE: RV Basal 2.90 cm RV Mid   1.90 cm RV Major 7.5 cm TAPSE:   20.3 mm RV s'    0.15 m/s  TRICUSPID VALVE/RVSP:          Normal Ranges: Peak TR Velocity:     2.10 m/s RV Syst Pressure:     21 mmHg  (< 30mmHg) IVC Diam:             0.90 cm  PULMONIC VALVE:          Normal Ranges: PV Accel Time:  77 msec  (>120ms) PV Max Johnathon:     1.1 m/s  (0.6-0.9m/s) PV Max P.0 mmHg PV Mean P.0 mmHg PV VTI:         14.50 cm  Pulmonary Veins: PulmV Crisostomo Johnathon: 30.80 cm/s PulmV S/D Johnathon:  1.40 PulmV Sys Johnathon:  43.20 cm/s  [id number] Deepti Miller MD Electronically signed on 2024 at 12:12:41 PM  ** Final **        Results from last 7 days   Lab Units  12/16/24 2021   HEMOGLOBIN A1C % 6.2*     BNP   Date/Time Value Ref Range Status   12/16/2024 08:21 PM 70 0 - 99 pg/mL Final       Test Results Pending At Discharge  Pending Labs       No current pending labs.            Hospital Course  Michael Reed is a 54 y.o. right handed male with history of L PCA stroke 2016 (w/ R HH and right hemiplegia), R PCA stroke 7/2024 both in setting of non-adherent to xarelto, Factor V Leiden (non-adherence to xarelto), HTN, HLD presenting as a BAT after being found down. On exam, he has dense right hemiplegia (worse from prior), severe expressive aphasia. CTH/CTA with L CAREN infarct and L A2 cut off - not a candidate for thrombolysis as out of the window with established hypodensity and on DOAC. His degree of aphasia doesn't fit with the L CAREN infarct seen on CTH. vEEG negative for epileptiform discharges. MRI showing subacute L CAREN infarct and R temporal parietal white matter disease.     Questionable compliance of Xarelto. Ex wife reports compliance however per fill history, last fill in October for 30 day supply. Given patient compliant on Xarelto as of late, this is considered Xarelto failure. Therefore, pt was switched to Eliquis 5mg BID.     EEG placed upon admission to r/o epileptiform activity. It did show intermittent spikes, however no seizures. Pt was not started on ASM given no clinical correlation and attribution to acute symptomatic event. Was monitored clinically. Pt medically stable and subsequently discharged to SNF.    Home Medications     Medication List      START taking these medications     apixaban 5 mg tablet; Commonly known as: Eliquis; Take 1 tablet (5 mg)   by mouth 2 times a day.   folic acid 1 mg tablet; Commonly known as: Folvite; Take 1 tablet (1 mg)   by mouth once daily.; Start taking on: December 21, 2024   multivitamin with minerals tablet; Take 1 tablet by mouth once daily.;   Start taking on: December 21, 2024   polyethylene glycol 17 gram packet;  Commonly known as: Glycolax,   Miralax; Take 17 g by mouth once daily as needed (for constipation).   thiamine 100 mg tablet; Commonly known as: Vitamin B-1; Take 1 tablet   (100 mg) by mouth once daily.; Start taking on: December 21, 2024     CHANGE how you take these medications     atorvastatin 40 mg tablet; Commonly known as: Lipitor; Take 2 tablets   (80 mg) by mouth once daily at bedtime.; What changed: how much to take,   when to take this     CONTINUE taking these medications     albuterol 90 mcg/actuation inhaler   amLODIPine 10 mg tablet; Commonly known as: Norvasc   aspirin 81 mg chewable tablet   Breo Ellipta 200-25 mcg/dose inhaler; Generic drug: fluticasone   furoate-vilanteroL   carvedilol 3.125 mg tablet; Commonly known as: Coreg   gabapentin 300 mg capsule; Commonly known as: Neurontin   lisinopril 40 mg tablet   PARoxetine 20 mg tablet; Commonly known as: Paxil     STOP taking these medications     Xarelto 20 mg tablet; Generic drug: rivaroxaban     Outpatient Follow-Up  Future Appointments   Date Time Provider Department Center   1/16/2025  9:45 AM Sanford Drake MD [id number] Academic     Destin Madrid DO  PGY-2 Neurology

## 2024-12-20 NOTE — PROGRESS NOTES
ID: Michael Reed is a 54 y.o. male on hospital day 4 for acute cerebral infarction.    Subjective     NAEO. This AM, no new/acute complaints.     Objective   Vitals 24hrs:  Heart Rate:  []   Temp:  [35.9 °C (96.6 °F)-36.9 °C (98.4 °F)]   Resp:  [16-18]   BP: (142-156)/()   SpO2:  [93 %-98 %]      I/Os 2hr:  Intake/Output Summary (Last 24 hours) at 2024 0842  Last data filed at 2024 0618  Gross per 24 hour   Intake 560 ml   Output 250 ml   Net 310 ml     Physical Exam:  Alert sitting in NAD. Mute with no comprehensible sounds. Receptive aphasia improved, following simple commands.  R HH (known) on BTT testing  Right facial droop noted   Muscle bulk and tone were normal   R sided hemiplegia, LUE/LLE AG spontaneously w/o drift  SILT to nox stim    NIHSS  NIH Stroke Scale  1A. Level of Consciousness: Arouses to Minor Stimulation  1B. Ask Month and Age: No Questions Right  1C. Blink Eyes & Squeeze Hands: Performs Both Tasks  2. Best Gaze: Normal  3. Visual: No Visual Loss  4. Facial Palsy: Minor Paralysis  5A. Motor - Left Arm: No Drift  5B. Motor - Right Arm: No Movement  6A. Motor - Left Leg: No Drift  6B. Motor - Right Leg: No Movement  7. Limb Ataxia: Present in Two Limbs  8. Sensory Loss: Mild-to-Moderate Sensory Loss  9. Best Language: Mute, Global Aphasia  10. Dysarthria: Severe Dysarthria  11. Extinction and Inattention: Visual, Tactile, Auditory, Spatial, or Personal Inattention  NIH Stroke Scale: 21       Medications  Scheduled:  amLODIPine, 10 mg, oral, Daily  aspirin, 81 mg, oral, Daily  atorvastatin, 40 mg, oral, Nightly  enoxaparin, 40 mg, subcutaneous, Daily  fluticasone furoate-vilanteroL, 1 puff, inhalation, Daily  folic acid, 1 mg, oral, Daily  gabapentin, 300 mg, oral, q8h MARILU  multivitamin with minerals, 1 tablet, oral, Daily  PARoxetine, 20 mg, oral, Daily  polyethylene glycol, 17 g, oral, Daily  thiamine, 100 mg, oral, Daily      PRN:  PRN medications: [] hydrALAZINE  **FOLLOWED BY** hydrALAZINE, oxygen   Continuous:     Lab Results  Results from last 72 hours   Lab Units 12/19/24  0740 12/18/24  1234   SODIUM mmol/L 142 143   POTASSIUM mmol/L 3.2* 3.5   BUN mg/dL 15 19   CREATININE mg/dL 1.58* 1.98*   CALCIUM mg/dL 8.7 9.5   MAGNESIUM mg/dL 2.21 2.54*      Results from last 72 hours   Lab Units 12/19/24  0740 12/18/24  1234   WBC AUTO x10*3/uL 7.3 9.0   HEMOGLOBIN g/dL 12.4* 14.1   HEMATOCRIT % 37.6* 44.6   PLATELETS AUTO x10*3/uL 218 231                              Imaging Results  MR brain wo IV contrast    Result Date: 7/23/2024  MRI BRAIN: 1. Acute cortical infarct in the medial-most right occipital lobe involving PCA territory. 2. Chronic left PCA territory infarct involving the occipital lobe, parahippocampal gyrus, and dorsal medial left thalamus. 3. Moderate burden of supratentorial and infratentorial chronic small vessel ischemic changes including left cerebellar infarct, central pontine  infarct, supratentorial white matter disease.   MRA HEAD AND NECK: 1. No evidence of major vessel intracranial or extracranial occlusion. 2. Moderate narrowing of the intracranial vertebral artery which is slightly progressed from 05/21/2016. 3. Chronically occluded left PCA in keeping with remote left PCA territory infarct.   MACRO: Denita Cox discussed the significance and urgency of this critical finding by telephone with  Dr Lopez on 7/23/2024 at 5:05 a.m.. (**-RCF-**) Findings:  See findings.   Signed by: Yefri Thomas 7/23/2024 8:29 AM Dictation workstation:   [id number]     Assessment/Plan   Michael Reed is a 54 y.o. right handed male with history of L PCA stroke 2016 (w/ R HH and right hemiplegia), R PCA stroke 7/2024 both in setting of non-adherent to xarelto, Factor V Leiden (non-adherence to xarelto), HTN, HLD presenting as a BAT after being found down. On exam, he has dense right hemiplegia (worse from prior), severe expressive aphasia. CTH/CTA with L  CAREN infarct and L A2 cut off - not a candidate for thrombolysis as out of the window with established hypodensity and on DOAC. His degree of aphasia doesn't fit with the L CAREN infarct seen on CTH. vEEG negative for epileptiform discharges. MRI showing subacute L CAREN infarct and R temporal parietal white matter disease.    Questionable compliance of Xarelto. Ex wife reports compliance however per fill history, last fill in October for 30 day supply.      Updates 12/20/24:  - Given discussion with wife and wife's daughter, pt was reportedly compliant on Xarelto, plan to start Eliquis 5mg BID given Xarelto failure  -Resume home Coreg 3.125 BID given elevated pressures (-167)  -Spikes on EEG, D/t spikes iso acute stroke, will not initiate ASM at this time, plan to monitor clinically  -Medically ready for discharge, pending SNF placement     Stroke Classification:  Type: Ischemic stroke  Subtype/etiology: undetermined - likely embolic   Vessels involved: L CAREN   Neurological manifestations:  Pre-Intervention Deficits: NIHSS 20 (with breakdown)  Pre-stroke mRS: 3  Initial treatment: None  Anti-platelets or Anti-coagulation management: Aspirin  Vascular Risk Factors: Factor V Leiden, HTN/HLD   Antiplatelet/antithrombotic plan for stroke prevention: ASA   VTE prophylaxis: lovenox      #L CAREN infarct   #Factor V Leiden   - Stroke work up:        - Utox negative       - , A1C 6.2, BNP 70       - TTE w/ bubble study: EF 40%, impaired relaxation and intrapulmonary shunt       - Consider utility of MRI w/o contrast in AM        - MRI 12/18: redemonstration of subacute infarct of L CAREN in addition to focal subacute infarct in R temporal parietal white matter.  -  vEEG given aphasia on exam to rule out seizure   - L hemispheric lesion, spikes detected at end of EEG   - EEG discontinued   -D/t spikes are iso acute stroke, will not initiate ASM at this time, plan to monitor clinically  - MRI pending for stroke burden  and aphasia not explained by findings on CTH  - Continue home ASA 81 mg daily - hold off on AC for now given stroke burden.   - Given discussion with wife and wife's daughter, pt was reportedly compliant on Xarelto, plan to start Eliquis 5mg BID given Xarelto failure  - Continue home atorvastatin 40 mg daily   - SLP/PT/OT consult   - Regular diet     #Recent heavy alcohol use   - Unclear last drink   - CIWA protocol, discontinue since more than 3 days  - Thiamine 500 mg IV TID x3 days --> 100 mg daily PO     #HTN  #HLD   - Resume home Coreg 3.125 BID given elevated pressures (-167)  - Hold home antihypertensives - amlodipine 10 mg daily,Lisinopril 40 mg daily   - Continue atorvastatin 40 mg daily      Vascular Risk Factor modification goals:  Blood pressure goals: avoid hypotension SBP <100 that could worsen cerebral perfusion, Ischemic stroke- early permissive hypertension SBP < 220 mmHg with cautious inpatient lowering  Lipid Goals: education on healthy diet and statin therapy to maintain or achieve goal LDL-cholesterol < 70mg  Glucose Goals: early treatment of hyperglycemia to goal glucose 140-180 mg/dl with long-term goal A1c < 7%   Smoking Cessation and Education  Assessment for Rehabilitation needs   Patient and family education on signs and symptoms of stroke, calling 911, healthy strategies for stroke prevention.       F: PRN (caution - EF 30-35%)  E: PRN, K > 4, Mg > 2, Phos >3   N: Regular diet  A: PIV    DVT ppx: lovenox    GI ppx: not indicated        Code status: DNR ok for intubation (confirmed on admission with ex-wife)  NOK: Tiffany Reed (ex-wife - not , POA) - [phone]     Destin Madrid DO  PGY-2 Neurology  Stroke Neurology [id number]    Pt seen and discussed with the attending physician, Dr. Colbert.

## 2024-12-20 NOTE — PROGRESS NOTES
Occupational Therapy    Occupational Therapy Treatment    Name: Michael Reed  MRN: [id number]  : 1970  Date: 24  Time Calculation  Start Time: 1006  Stop Time: 1019  Time Calculation (min): 13 min    Assessment:  Medical Staff Made Aware: Yes  End of Session Communication: Bedside nurse  End of Session Patient Position: Up in chair, Alarm on  Plan:  Treatment Interventions: ADL retraining, UE strengthening/ROM, Endurance training, Cognitive reorientation, Neuromuscular reeducation, Fine motor coordination activities  OT Frequency: 4 times per week  OT Discharge Recommendations: High intensity level of continued care  OT Recommended Transfer Status: Maximum assist, Assist of 2  OT - OK to Discharge: Yes    Subjective   General:  OT Last Visit  OT Received On: 24  Co-Treatment: PT  Co-Treatment Reason: to maximize mobility and safety  Prior to Session Communication: Bedside nurse  Patient Position Received: Bed, 3 rail up, Alarm on       Cognition:  Overall Cognitive Status: Impaired  Orientation Level: Unable to assess  Complex Functional Tasks: Moderate    Pain Assessment:  Pain Assessment  Pain Assessment: 0-10  0-10 (Numeric) Pain Score: 0 - No pain     Objective   Activities of Daily Living:        Grooming  Grooming Level of Assistance: Setup, Visual aid cues, Minimal verbal cues  Grooming Where Assessed: Standing sinkside      Toileting  Toileting Level of Assistance: Contact guard, Moderate verbal cues, Tactile cues  Where Assessed: Toilet  Toileting Comments: mahsa-care    Bed Mobility/Transfers:   Bed Mobility  Bed Mobility: Yes  Bed Mobility 1  Bed Mobility 1: Supine to sitting  Level of Assistance 1: Contact guard  Bed Mobility Comments 1: HOB elevated    Transfers  Transfer: Yes  Transfer 1  Technique 1: Sit to stand, Stand to sit  Transfer Device 1: Kris-walker  Transfer Level of Assistance 1: Minimum assistance, +2, Minimal verbal cues    Balance:  Dynamic Standing Balance  Dynamic  Standing-Comments: Pt required min assist x 2 to complete short distance ambulation using a hemicane.  Static Standing Balance  Static Standing-Balance Support: Left upper extremity supported  Static Standing-Level of Assistance: Contact guard    Outcome Measures:  [facility] Daily Activity  Putting on and taking off regular lower body clothing: A lot  Bathing (including washing, rinsing, drying): A lot  Putting on and taking off regular upper body clothing: A lot  Toileting, which includes using toilet, bedpan or urinal: A lot  Taking care of personal grooming such as brushing teeth: A little  Eating Meals: A little  Daily Activity - Total Score: 14     Education Documentation  Body Mechanics, taught by Ariel Barfield OT at 12/20/2024 12:50 PM.  Learner: Patient  Readiness: Acceptance  Method: Explanation  Response: Needs Reinforcement    Home Exercise Program, taught by Ariel Barfield OT at 12/20/2024 12:50 PM.  Learner: Patient  Readiness: Acceptance  Method: Explanation  Response: Needs Reinforcement    ADL Training, taught by Ariel Barfield OT at 12/20/2024 12:50 PM.  Learner: Patient  Readiness: Acceptance  Method: Explanation  Response: Needs Reinforcement    Body Mechanics, taught by Ariel Barfield OT at 12/19/2024  2:05 PM.  Learner: Patient  Readiness: Acceptance  Method: Explanation  Response: Needs Reinforcement    Home Exercise Program, taught by Ariel Barfield OT at 12/19/2024  2:05 PM.  Learner: Patient  Readiness: Acceptance  Method: Explanation  Response: Needs Reinforcement    ADL Training, taught by Ariel Barfield OT at 12/19/2024  2:05 PM.  Learner: Patient  Readiness: Acceptance  Method: Explanation  Response: Needs Reinforcement    Education Comments  No comments found.        Goals:  Encounter Problems       Encounter Problems (Active)       ADLs       Patient will perform UB and LB bathing  with moderate assist level of assistance and grab bars. (Progressing)       Start:  12/17/24     Expected End:  01/07/25            Patient with complete upper body dressing with minimal assist  level of assistance donning and doffing all UE clothes with PRN adaptive equipment while edge of bed  (Progressing)       Start:  12/17/24    Expected End:  01/07/25            Patient with complete lower body dressing with minimal assist  level of assistance donning and doffing all LE clothes  with PRN adaptive equipment while edge of bed  (Progressing)       Start:  12/17/24    Expected End:  01/07/25            Patient will complete daily grooming tasks washing face/hair with stand by assist level of assistance and PRN adaptive equipment while edge of bed . (Progressing)       Start:  12/17/24    Expected End:  01/07/25            Patient will complete toileting including hygiene clothing management/hygiene with minimal assist  level of assistance and grab bars. (Progressing)       Start:  12/17/24    Expected End:  01/07/25               BALANCE       Pt will maintain dynamic standing balance during ADL task with moderate assist level of assistance in order to demonstrate decreased risk of falling and improved postural control. (Progressing)       Start:  12/17/24    Expected End:  01/07/25               COGNITION/SAFETY       Pt will follow One  steps verbal cues 50% of the time during OT tx session. (Progressing)       Start:  12/17/24    Expected End:  01/07/25               EXERCISE/STRENGTHENING       Patient will complete RUE exercises for 15 reps in order to improve strength and activity for ADL performance.  (Progressing)       Start:  12/17/24    Expected End:  01/07/25               MOBILITY       Patient will perform Functional mobility min Household distances/Community Distances with minimal assist  level of assistance and least restrictive device in order to improve safety and functional mobility. (Progressing)       Start:  12/17/24    Expected End:  01/07/25               TRANSFERS       Patient will  perform bed mobility minimal assist  level of assistance and bed rails in order to improve safety and independence with mobility (Progressing)       Start:  12/17/24    Expected End:  01/07/25            Patient will complete sit to stand transfer with minimal assist  level of assistance and least restrictive device in order to improve safety and prepare for out of bed mobility. (Progressing)       Start:  12/17/24    Expected End:  01/07/25 12/20/24 at 12:51 PM   Ariel Barfield OTR/L, OTD  [phone]

## 2024-12-22 NOTE — ED PROCEDURE NOTE
Procedure  Critical Care    Performed by: Brie Almonte MD  Authorized by: Brie Almonte MD    Critical care provider statement:     Critical care time (minutes):  60    Critical care start time:  12/16/2024 7:15 PM    Critical care end time:  12/16/2024 8:15 PM    Critical care was necessary to treat or prevent imminent or life-threatening deterioration of the following conditions:  CNS failure or compromise    Critical care was time spent personally by me on the following activities:  Discussions with consultants, evaluation of patient's response to treatment, examination of patient, obtaining history from patient or surrogate, ordering and review of laboratory studies, ordering and review of radiographic studies and re-evaluation of patient's condition               Brie Almonte MD  12/22/24 0961

## 2025-01-02 ENCOUNTER — NURSING HOME VISIT (OUTPATIENT)
Dept: POST ACUTE CARE | Facility: EXTERNAL LOCATION | Age: 55
End: 2025-01-02
Payer: MEDICARE

## 2025-01-02 DIAGNOSIS — R11.2 NAUSEA AND VOMITING, UNSPECIFIED VOMITING TYPE: ICD-10-CM

## 2025-01-02 DIAGNOSIS — Z74.09 IMMOBILITY: ICD-10-CM

## 2025-01-02 DIAGNOSIS — E78.5 HYPERLIPIDEMIA, UNSPECIFIED HYPERLIPIDEMIA TYPE: ICD-10-CM

## 2025-01-02 DIAGNOSIS — J45.909 ASTHMA, UNSPECIFIED ASTHMA SEVERITY, UNSPECIFIED WHETHER COMPLICATED, UNSPECIFIED WHETHER PERSISTENT (HHS-HCC): ICD-10-CM

## 2025-01-02 DIAGNOSIS — I10 BENIGN ESSENTIAL HTN: ICD-10-CM

## 2025-01-02 DIAGNOSIS — G81.91 RIGHT HEMIPLEGIA (MULTI): ICD-10-CM

## 2025-01-02 DIAGNOSIS — M62.81 MUSCLE WEAKNESS (GENERALIZED): ICD-10-CM

## 2025-01-02 DIAGNOSIS — I63.422 STROKE DUE TO EMBOLISM OF LEFT ANTERIOR CEREBRAL ARTERY (MULTI): Primary | ICD-10-CM

## 2025-01-02 DIAGNOSIS — F32.9 MAJOR DEPRESSIVE DISORDER WITH CURRENT ACTIVE EPISODE, UNSPECIFIED DEPRESSION EPISODE SEVERITY, UNSPECIFIED WHETHER RECURRENT: ICD-10-CM

## 2025-01-02 DIAGNOSIS — Z86.73 HISTORY OF CVA (CEREBROVASCULAR ACCIDENT): ICD-10-CM

## 2025-01-02 DIAGNOSIS — D68.51 FACTOR V LEIDEN MUTATION (MULTI): ICD-10-CM

## 2025-01-02 DIAGNOSIS — K59.00 CONSTIPATION, UNSPECIFIED CONSTIPATION TYPE: ICD-10-CM

## 2025-01-02 DIAGNOSIS — R47.01 EXPRESSIVE APHASIA: ICD-10-CM

## 2025-01-02 DIAGNOSIS — G62.9 PERIPHERAL POLYNEUROPATHY: ICD-10-CM

## 2025-01-02 PROCEDURE — 99310 SBSQ NF CARE HIGH MDM 45: CPT | Performed by: NURSE PRACTITIONER

## 2025-01-02 NOTE — LETTER
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    HPI     54 year old right handed male with medical history of L PCA stroke  (w/ R HH and right hemiplegia), R PCA stroke 2024 both in setting of non-adherent to xarelto, Factor V Leiden (non-adherence to xarelto), HTN, and HLD.    HOSPITAL COURSE:       Patient presented to [facility] on 24 via EMS, as a BAT after being found down.   History  was obtained from EMS and over the phone from his ex-wife, Tiffany Reed.  She reports she last saw him 3 days prior to him presenting to the ER.   His neighbor found him on the porch naked, down for an unknown duration.        At baseline post recent strokes. He has significant right sided hemiparesis and very limited movement of RUE.  He was able to walk short distances using cane.  Otherwise he had normal speech.   He was able to perform basic ADLs but unable to perform iADLs (finances etc).        Ex-wife had reported he was compliant with xarelto since last hospitalization in July and it took it the day before he presented to the ER.  No prior history of seizures.   She reports recent heavy alcohol use but no drug use otherwise.        On physical exam, he had dense right hemiplegia (worse from prior), and severe expressive aphasia.   CT-head/CTA with L CAREN infarct and L A2 cut off - not a candidate for thrombolysis as out of the window with established hypodensity and on DOAC.   His degree of aphasia did not fit with the L CAREN infarct seen on CTH.   EEG  was negative for epileptiform discharges.  MRI showed subacute L CAREN infarct and R temporal parietal white matter disease.  Questionable compliance of Xarelto.  Ex wife reported compliance however per fill history,  last fill in October for 30 day supply.   Given patient was compliant on Xarelto as of late, this is considered Xarelto failure.   Therefore, pt was switched to Eliquis 5mg BID.    EEG placed upon admission to r/o epileptiform activity.   It did show intermittent spikes, however no seizures.   Pt was not started on ASM given no clinical correlation and attribution to acute symptomatic event.   Was monitored clinically.   Pt medically stable and subsequently discharged to SNF.    Patient was admitted to [facility] from 12/16/24 to 12/20/24.    Patient admitted to [facility] for skilled services on 12/20/24.    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    Patient was evaluated/treated in the hospital.   Discharged on Eliquis and aspirin.   CT-head/CTA with L CAREN infarct and L A2 cut off.  He was not a candidate for thrombolysis as out of the window with established hypodensity and on DOAC.   His degree of aphasia did not fit with the L CAREN infarct seen on CTH.   He was evaluated by speech therapy in the hospital, he passed a swallow evaluation and discharged on   Regular diet, regular texture, thin consistency.   Patient seen today, calm and cooperative.  He is sitting up in the side of his bed.   Unable to answer questions due to severe expressive aphasia.  Patient at [facility] for skilled services.  Able to nod his head to answer questions.  No complaints of headaches, dizziness or chest pain.    Benign essential HTN:  On amlodipine, lisinopril and carvedilol.   Recent /85.     Hyperlipidemia:  On atorvastatin.    Factor V Leiden:  On Eliquis.     Asthma:  On Breo Ellipta and albuterol HFA inhaler PRN.  On RA.  No reports of cough, SOB or oxygen desaturations.     Peripheral neuropathy:  On gabapentin.    Major depressive disorder:  On  paroxetine.    Nausea and vomiting;  On ondansetron.   No reports/complaints of N/V.     Constipation:   On Miralax.   No reports/complaints of constipation.     History of L PCA stroke 2016 (w/ R HH and right hemiplegia),   R PCA stroke 7/2024 both in setting of non-adherent to xarelto.    Immobility; generalized weakness:  Patient unable to ambulate.  Very weak.  At [facility] for skilled services.                                   Reviewed [facility] hospital records from recent hospitalization.  Reviewed  EMR  Reviewed medical, social, surgical and family history.  Reviewed all current medications and performed medication reconciliation.  Performed prescription drug management.  Reviewed vital signs AND lab results  Reviewed [facility] Documentation  Discussed patient with nursing.  Spent greater than 50 minutes on patient visit.             ROS: 10 point ROS performed; Negative unless noted in HPI.      Medical History:   Diagnosis Date   • Cardiomegaly 03/11/2015     Cardiomegaly   • Essential (primary) hypertension 06/18/2015     Hypertension   • Other pulmonary embolism without acute cor pulmonale (Multi) 06/18/2015     Pulmonary embolism         Surgical History:   Procedure Laterality Date   • CT ABDOMEN PELVIS ANGIOGRAM W AND/OR WO IV CONTRAST   12/6/2016     CT ABDOMEN PELVIS ANGIOGRAM W AND/OR WO IV CONTRAST 12/6/2016 [facility] CLINICAL LEGACY   • MR HEAD ANGIO WO IV CONTRAST   4/30/2017     MR HEAD ANGIO WO IV CONTRAST 4/30/2017 [facility] CLINICAL LEGACY   • MR HEAD ANGIO WO IV CONTRAST   12/6/2016     MR HEAD ANGIO WO IV CONTRAST 12/6/2016 [facility] CLINICAL LEGACY   • MR NECK ANGIO WO IV CONTRAST   4/30/2017     MR NECK ANGIO WO IV CONTRAST 4/30/2017 [facility] CLINICAL LEGACY   • MR NECK ANGIO WO IV CONTRAST   12/6/2016     MR NECK ANGIO WO IV CONTRAST 12/6/2016 [facility] CLINICAL LEGACY       SOCIAL HISTORY:  .  Former smoker.  Recent heavy alcohol use.  Previous marijuana use.  No other illicit  "drug use.     FAMILY HISTORY:  Unavailable.     BMP: Date: 12/30/2024 Na 141 K 4.3 Cr 1.50 BUN 18 glucose 84 Ca 9.4 GFR 55  CBC: Date: 12/30/2024 WBC 7.79 Hgb 13 hematocrit 41 platelets 368         Lab Results   Component Value Date    WBC 7.3 12/19/2024    HGB 12.4 (L) 12/19/2024    HCT 37.6 (L) 12/19/2024     12/19/2024    CHOL 178 12/16/2024    TRIG 92 12/16/2024    HDL 49.5 12/16/2024    ALT 23 12/16/2024    AST 88 (H) 12/16/2024     12/20/2024    K 3.8 12/20/2024     12/20/2024    CREATININE 1.56 (H) 12/20/2024    BUN 13 12/20/2024    CO2 28 12/20/2024    INR 1.1 12/16/2024    HGBA1C 6.2 (H) 12/16/2024             /85   Pulse 74   Temp 36.9 °C (98.4 °F)   Resp 20   Ht 1.676 m (5' 6\")   Wt 120 kg (265 lb 8 oz)   SpO2 95%   BMI 42.85 kg/m²      Physical Exam  Vitals and nursing note reviewed.   Constitutional:       General: He is awake.      Appearance: He is ill-appearing.   HENT:      Head: Normocephalic.      Right Ear: External ear normal.      Left Ear: External ear normal.      Nose: Nose normal.      Mouth/Throat:      Mouth: Mucous membranes are moist.      Pharynx: Oropharynx is clear.   Eyes:      Extraocular Movements: Extraocular movements intact.      Conjunctiva/sclera: Conjunctivae normal.      Pupils: Pupils are equal, round, and reactive to light.   Cardiovascular:      Rate and Rhythm: Normal rate and regular rhythm.      Pulses: Normal pulses.      Heart sounds: Normal heart sounds.   Pulmonary:      Effort: Pulmonary effort is normal.      Breath sounds: Normal breath sounds.   Abdominal:      General: Bowel sounds are normal.      Palpations: Abdomen is soft.   Musculoskeletal:         General: Normal range of motion.      Cervical back: Normal range of motion and neck supple.   Skin:     General: Skin is warm and dry.   Neurological:      Mental Status: He is alert. He is confused.      Cranial Nerves: Cranial nerve deficit, dysarthria and facial asymmetry " present.      Sensory: Sensation is intact.      Motor: Weakness present.      Coordination: Coordination abnormal.      Gait: Gait abnormal.      Comments: RUE/RLE hemiplegia;  mute/severe dysarthria;  + partial neglect.      Right facial droop   Psychiatric:         Mood and Affect: Affect is inappropriate.         Speech: He is noncommunicative.         Behavior: Behavior normal. Behavior is cooperative.         Cognition and Memory: Cognition is impaired.         Judgment: Judgment is inappropriate.          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    Patient was evaluated/treated in the hospital.   Continue Eliquis and aspirin.   CT-head/CTA with L CAREN infarct and L A2 cut off.  Continue at [facility] skilled services PT/OT/ST.  Monitor for headaches.    Benign essential HTN:  Continue amlodipine, lisinopril and carvedilol.   Monitor Bps.     Hyperlipidemia:  Continue atorvastatin.    Factor V Leiden:  Continue Eliquis.     Asthma:  Continue Breo Ellipta and albuterol HFA inhaler PRN.  Monitor for cough, SOB or oxygen desaturations.     Peripheral neuropathy:  Continue gabapentin.    Major depressive disorder:  Continue paroxetine.    Nausea and vomiting;  Continue ondansetron.     Constipation:   Continue Miralax.   Monitor for constipation.     History of L PCA stroke 2016 (w/ R HH and right hemiplegia),   R PCA stroke 7/2024 both in setting of non-adherent to xarelto.    Immobility; generalized weakness:  Patient unable to ambulate.  Very weak.  At [facility] [employer] services.         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 (Multi) - Primary     Other Visit Diagnoses       Right hemiplegia (Multi)        Expressive aphasia        Benign essential HTN        Hyperlipidemia,  unspecified hyperlipidemia type        Asthma, unspecified asthma severity, unspecified whether complicated, unspecified whether persistent ([facility])        Peripheral polyneuropathy        Major depressive disorder with current active episode, unspecified depression episode severity, unspecified whether recurrent        Nausea and vomiting, unspecified vomiting type        Constipation, unspecified constipation type        Immobility        Muscle weakness (generalized)        History of CVA (cerebrovascular accident)                ANDI Figueroa       Electronically Signed By: ANDI Figueroa   1/5/25  6:58 PM

## 2025-01-05 VITALS
DIASTOLIC BLOOD PRESSURE: 85 MMHG | HEIGHT: 66 IN | TEMPERATURE: 98.4 F | SYSTOLIC BLOOD PRESSURE: 136 MMHG | BODY MASS INDEX: 42.67 KG/M2 | RESPIRATION RATE: 20 BRPM | WEIGHT: 265.5 LBS | HEART RATE: 74 BPM | OXYGEN SATURATION: 95 %

## 2025-01-05 NOTE — PROGRESS NOTES
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    HPI     54 year old right handed male with medical history of L PCA stroke 2016 (w/ R HH and right hemiplegia), R PCA stroke 7/2024 both in setting of non-adherent to xarelto, Factor V Leiden (non-adherence to xarelto), HTN, and HLD.    HOSPITAL COURSE:       Patient presented to [facility] on 12/16/24 via EMS, as a BAT after being found down.   History  was obtained from EMS and over the phone from his ex-wife, Tiffany Reed.  She reports she last saw him 3 days prior to him presenting to the ER.   His neighbor found him on the porch naked, down for an unknown duration.        At baseline post recent strokes. He has significant right sided hemiparesis and very limited movement of RUE.  He was able to walk short distances using cane.  Otherwise he had normal speech.   He was able to perform basic ADLs but unable to perform iADLs (finances etc).        Ex-wife had reported he was compliant with xarelto since last hospitalization in July and it took it the day before he presented to the ER.  No prior history of seizures.   She reports recent heavy alcohol use but no drug use otherwise.        On physical exam, he had dense right hemiplegia (worse from prior), and severe expressive aphasia.   CT-head/CTA with L CAREN infarct and L A2 cut off - not a candidate for thrombolysis as out of the window with established hypodensity and on DOAC.   His degree of aphasia did not fit with the L CAREN infarct seen on CTH.   EEG  was negative for epileptiform discharges.  MRI showed subacute L CAREN infarct and R temporal parietal white matter disease.  Questionable compliance of Xarelto.  Ex wife reported compliance however per fill history, last fill in October for 30 day supply.   Given patient was  compliant on Xarelto as of late, this is considered Xarelto failure.   Therefore, pt was switched to Eliquis 5mg BID.    EEG placed upon admission to r/o epileptiform activity.   It did show intermittent spikes, however no seizures.   Pt was not started on ASM given no clinical correlation and attribution to acute symptomatic event.   Was monitored clinically.   Pt medically stable and subsequently discharged to SNF.    Patient was admitted to [facility] from 12/16/24 to 12/20/24.    Patient admitted to [facility] for skilled services on 12/20/24.    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    Patient was evaluated/treated in the hospital.   Discharged on Eliquis and aspirin.   CT-head/CTA with L CAREN infarct and L A2 cut off.  He was not a candidate for thrombolysis as out of the window with established hypodensity and on DOAC.   His degree of aphasia did not fit with the L CAREN infarct seen on CTH.   He was evaluated by speech therapy in the hospital, he passed a swallow evaluation and discharged on   Regular diet, regular texture, thin consistency.   Patient seen today, calm and cooperative.  He is sitting up in the side of his bed.   Unable to answer questions due to severe expressive aphasia.  Patient at [facility] for skilled services.  Able to nod his head to answer questions.  No complaints of headaches, dizziness or chest pain.    Benign essential HTN:  On amlodipine, lisinopril and carvedilol.   Recent /85.     Hyperlipidemia:  On atorvastatin.    Factor V Leiden:  On Eliquis.     Asthma:  On Breo Ellipta and albuterol HFA inhaler PRN.  On RA.  No reports of cough, SOB or oxygen desaturations.     Peripheral neuropathy:  On gabapentin.    Major depressive disorder:  On paroxetine.    Nausea and vomiting;  On ondansetron.   No reports/complaints of  N/V.     Constipation:   On Miralax.   No reports/complaints of constipation.     History of L PCA stroke 2016 (w/ R HH and right hemiplegia),   R PCA stroke 7/2024 both in setting of non-adherent to xarelto.    Immobility; generalized weakness:  Patient unable to ambulate.  Very weak.  At [facility] for skilled services.                                   Reviewed [facility] hospital records from recent hospitalization.  Reviewed  EMR  Reviewed medical, social, surgical and family history.  Reviewed all current medications and performed medication reconciliation.  Performed prescription drug management.  Reviewed vital signs AND lab results  Reviewed [facility] Documentation  Discussed patient with nursing.  Spent greater than 50 minutes on patient visit.             ROS: 10 point ROS performed; Negative unless noted in HPI.      Medical History:   Diagnosis Date    Cardiomegaly 03/11/2015     Cardiomegaly    Essential (primary) hypertension 06/18/2015     Hypertension    Other pulmonary embolism without acute cor pulmonale (Multi) 06/18/2015     Pulmonary embolism         Surgical History:   Procedure Laterality Date    CT ABDOMEN PELVIS ANGIOGRAM W AND/OR WO IV CONTRAST   12/6/2016     CT ABDOMEN PELVIS ANGIOGRAM W AND/OR WO IV CONTRAST 12/6/2016 [facility] CLINICAL LEGACY    MR HEAD ANGIO WO IV CONTRAST   4/30/2017     MR HEAD ANGIO WO IV CONTRAST 4/30/2017 [facility] CLINICAL LEGACY    MR HEAD ANGIO WO IV CONTRAST   12/6/2016     MR HEAD ANGIO WO IV CONTRAST 12/6/2016 [facility] CLINICAL LEGACY    MR NECK ANGIO WO IV CONTRAST   4/30/2017     MR NECK ANGIO WO IV CONTRAST 4/30/2017 [facility] CLINICAL LEGACY    MR NECK ANGIO WO IV CONTRAST   12/6/2016     MR NECK ANGIO WO IV CONTRAST 12/6/2016 [facility] CLINICAL LEGACY       SOCIAL HISTORY:  .  Former smoker.  Recent heavy alcohol use.  Previous marijuana use.  No other illicit drug use.     FAMILY HISTORY:  Unavailable.     BMP: Date: 12/30/2024 Na 141 K 4.3 Cr 1.50  "BUN 18 glucose 84 Ca 9.4 GFR 55  CBC: Date: 12/30/2024 WBC 7.79 Hgb 13 hematocrit 41 platelets 368         Lab Results   Component Value Date    WBC 7.3 12/19/2024    HGB 12.4 (L) 12/19/2024    HCT 37.6 (L) 12/19/2024     12/19/2024    CHOL 178 12/16/2024    TRIG 92 12/16/2024    HDL 49.5 12/16/2024    ALT 23 12/16/2024    AST 88 (H) 12/16/2024     12/20/2024    K 3.8 12/20/2024     12/20/2024    CREATININE 1.56 (H) 12/20/2024    BUN 13 12/20/2024    CO2 28 12/20/2024    INR 1.1 12/16/2024    HGBA1C 6.2 (H) 12/16/2024             /85   Pulse 74   Temp 36.9 °C (98.4 °F)   Resp 20   Ht 1.676 m (5' 6\")   Wt 120 kg (265 lb 8 oz)   SpO2 95%   BMI 42.85 kg/m²      Physical Exam  Vitals and nursing note reviewed.   Constitutional:       General: He is awake.      Appearance: He is ill-appearing.   HENT:      Head: Normocephalic.      Right Ear: External ear normal.      Left Ear: External ear normal.      Nose: Nose normal.      Mouth/Throat:      Mouth: Mucous membranes are moist.      Pharynx: Oropharynx is clear.   Eyes:      Extraocular Movements: Extraocular movements intact.      Conjunctiva/sclera: Conjunctivae normal.      Pupils: Pupils are equal, round, and reactive to light.   Cardiovascular:      Rate and Rhythm: Normal rate and regular rhythm.      Pulses: Normal pulses.      Heart sounds: Normal heart sounds.   Pulmonary:      Effort: Pulmonary effort is normal.      Breath sounds: Normal breath sounds.   Abdominal:      General: Bowel sounds are normal.      Palpations: Abdomen is soft.   Musculoskeletal:         General: Normal range of motion.      Cervical back: Normal range of motion and neck supple.   Skin:     General: Skin is warm and dry.   Neurological:      Mental Status: He is alert. He is confused.      Cranial Nerves: Cranial nerve deficit, dysarthria and facial asymmetry present.      Sensory: Sensation is intact.      Motor: Weakness present.      Coordination: " Coordination abnormal.      Gait: Gait abnormal.      Comments: RUE/RLE hemiplegia;  mute/severe dysarthria;  + partial neglect.      Right facial droop   Psychiatric:         Mood and Affect: Affect is inappropriate.         Speech: He is noncommunicative.         Behavior: Behavior normal. Behavior is cooperative.         Cognition and Memory: Cognition is impaired.         Judgment: Judgment is inappropriate.          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    Patient was evaluated/treated in the hospital.   Continue Eliquis and aspirin.   CT-head/CTA with L CAREN infarct and L A2 cut off.  Continue at [facility] skilled services PT/OT/ST.  Monitor for headaches.    Benign essential HTN:  Continue amlodipine, lisinopril and carvedilol.   Monitor Bps.     Hyperlipidemia:  Continue atorvastatin.    Factor V Leiden:  Continue Eliquis.     Asthma:  Continue Breo Ellipta and albuterol HFA inhaler PRN.  Monitor for cough, SOB or oxygen desaturations.     Peripheral neuropathy:  Continue gabapentin.    Major depressive disorder:  Continue paroxetine.    Nausea and vomiting;  Continue ondansetron.     Constipation:   Continue Miralax.   Monitor for constipation.     History of L PCA stroke 2016 (w/ R HH and right hemiplegia),   R PCA stroke 7/2024 both in setting of non-adherent to xarelto.    Immobility; generalized weakness:  Patient unable to ambulate.  Very weak.  At [facility] skilled services.         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 (Multi) - Primary     Other Visit Diagnoses       Right hemiplegia (Multi)        Expressive aphasia        Benign essential HTN        Hyperlipidemia, unspecified hyperlipidemia type        Asthma, unspecified asthma severity, unspecified  whether complicated, unspecified whether persistent ([facility])        Peripheral polyneuropathy        Major depressive disorder with current active episode, unspecified depression episode severity, unspecified whether recurrent        Nausea and vomiting, unspecified vomiting type        Constipation, unspecified constipation type        Immobility        Muscle weakness (generalized)        History of CVA (cerebrovascular accident)                Alejandra Mott, APRN-CNP

## 2025-01-16 ENCOUNTER — APPOINTMENT (OUTPATIENT)
Dept: OPHTHALMOLOGY | Facility: CLINIC | Age: 55
End: 2025-01-16
Payer: MEDICARE

## 2025-01-16 DIAGNOSIS — H40.10X3 OPEN-ANGLE GLAUCOMA OF BOTH EYES, SEVERE STAGE, UNSPECIFIED OPEN-ANGLE GLAUCOMA TYPE: Primary | ICD-10-CM

## 2025-01-16 PROCEDURE — 99214 OFFICE O/P EST MOD 30 MIN: CPT | Performed by: OPHTHALMOLOGY

## 2025-01-16 ASSESSMENT — PACHYMETRY
OD_CT(UM): 528
OS_CT(UM): 535

## 2025-01-16 ASSESSMENT — GONIOSCOPY
OS_SUPERIOR: 3/360
OD_SUPERIOR: 3/360

## 2025-01-16 ASSESSMENT — CUP TO DISC RATIO
OS_RATIO: 0.9
OD_RATIO: 0.75

## 2025-01-16 ASSESSMENT — VISUAL ACUITY
METHOD: SNELLEN - LINEAR
OS_SC: F & F
OD_SC: F & F

## 2025-01-16 ASSESSMENT — TONOMETRY
OD_IOP_MMHG: 19
OS_IOP_MMHG: 27
IOP_METHOD: GOLDMANN APPLANATION

## 2025-01-16 ASSESSMENT — SLIT LAMP EXAM - LIDS
COMMENTS: NORMAL
COMMENTS: NORMAL

## 2025-01-16 NOTE — PROGRESS NOTES
1 GLAUCOMA BOTH EYES: MODERATE TO LATE STAGE BOTH EYES, START LATANOPROST every day BOTH EYES (LUMIGAN SAMPLE GIVEN)    2-NS BOTH EYES    3-DRY EYES BOTH EYES      RTO iop CHECK BOTH EYSA DN PACHYMTRY 6-8 WEEKS

## 2025-01-30 ENCOUNTER — NURSING HOME VISIT (OUTPATIENT)
Dept: POST ACUTE CARE | Facility: EXTERNAL LOCATION | Age: 55
End: 2025-01-30
Payer: MEDICARE

## 2025-01-30 DIAGNOSIS — J45.909 ASTHMA, UNSPECIFIED ASTHMA SEVERITY, UNSPECIFIED WHETHER COMPLICATED, UNSPECIFIED WHETHER PERSISTENT (HHS-HCC): ICD-10-CM

## 2025-01-30 DIAGNOSIS — G81.91 RIGHT HEMIPLEGIA (MULTI): ICD-10-CM

## 2025-01-30 DIAGNOSIS — G62.9 PERIPHERAL POLYNEUROPATHY: ICD-10-CM

## 2025-01-30 DIAGNOSIS — K59.00 CONSTIPATION, UNSPECIFIED CONSTIPATION TYPE: ICD-10-CM

## 2025-01-30 DIAGNOSIS — Z74.09 IMMOBILITY: ICD-10-CM

## 2025-01-30 DIAGNOSIS — F32.9 MAJOR DEPRESSIVE DISORDER WITH CURRENT ACTIVE EPISODE, UNSPECIFIED DEPRESSION EPISODE SEVERITY, UNSPECIFIED WHETHER RECURRENT: ICD-10-CM

## 2025-01-30 DIAGNOSIS — R47.01 EXPRESSIVE APHASIA: ICD-10-CM

## 2025-01-30 DIAGNOSIS — I10 BENIGN ESSENTIAL HTN: Primary | ICD-10-CM

## 2025-01-30 DIAGNOSIS — I63.422 STROKE DUE TO EMBOLISM OF LEFT ANTERIOR CEREBRAL ARTERY (MULTI): ICD-10-CM

## 2025-01-30 DIAGNOSIS — D68.51 FACTOR V LEIDEN MUTATION (MULTI): ICD-10-CM

## 2025-01-30 DIAGNOSIS — M62.81 MUSCLE WEAKNESS (GENERALIZED): ICD-10-CM

## 2025-01-30 DIAGNOSIS — Z86.73 HISTORY OF CVA (CEREBROVASCULAR ACCIDENT): ICD-10-CM

## 2025-01-30 DIAGNOSIS — E78.5 HYPERLIPIDEMIA, UNSPECIFIED HYPERLIPIDEMIA TYPE: ICD-10-CM

## 2025-01-30 PROCEDURE — 99309 SBSQ NF CARE MODERATE MDM 30: CPT | Performed by: NURSE PRACTITIONER

## 2025-01-30 NOTE — LETTER
Patient: Michael Reed  : 1970    Encounter Date: 2025    Name: Michael Reed    YOB: 1970    Code Status: FULL CODE    Chief Complaint:  Follow up on HTN; etc....    HPI     55 year old right handed male with medical history of L PCA stroke  (w/ R HH and right hemiplegia), R PCA stroke 2024 both in setting of non-adherent to xarelto, Factor V Leiden (non-adherence to xarelto), HTN, and HLD.    Patient was admitted to [facility] from 24 to 24.    Patient admitted to [facility] for skilled services on 24.      Diagnoses as follows:    Benign essential HTN:  On amlodipine, lisinopril and carvedilol.   Recent Bps: 121/90, 142/80, 164/98.  Bps elevated at times.  Reminded nursing to use hydralazine PRN med when Bps are elevated.   Also has hydralazine ordered PRN for SBP >150 and DBP >90.  Patient seen today, calm and cooperative.  He is sitting up in a wheelchair in his room.  Unable to answer questions due to severe expressive aphasia.  Patient at [facility] for skilled services.  Able to nod his head to answer questions.  No complaints of headaches, dizziness or chest pain.    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    Patient was evaluated/treated in the hospital.   Discharged on Eliquis and aspirin.   CT-head/CTA with L CAREN infarct and L A2 cut off.  He was not a candidate for thrombolysis as out of the window with established hypodensity and on DOAC.   His degree of aphasia did not fit with the L CAREN infarct seen on CTH.   He was evaluated by speech therapy in the hospital, he passed a swallow evaluation and discharged on   Regular diet, regular texture, thin consistency.     Asthma:  On Breo Ellipta and albuterol HFA inhaler PRN.  On RA.  No reports of cough, SOB or oxygen desaturations.     Factor V Leiden:  On Eliquis.     Peripheral neuropathy:  On  gabapentin.    Hyperlipidemia:  On atorvastatin.    Major depressive disorder:  On paroxetine.    History of L PCA stroke 2016 (w/ R HH and right hemiplegia),   R PCA stroke 7/2024 both in setting of non-adherent to xarelto.    Immobility; generalized weakness:  Patient unable to ambulate.  Very weak.  At [facility] for skilled services.     Constipation:   On Miralax.   No reports/complaints of constipation.                                 Reviewed  EMR  Reviewed medical, social, surgical and family history.  Reviewed all current medications and performed medication reconciliation.  Performed prescription drug management.  Reviewed vital signs AND lab results  Reviewed [facility] Documentation  Discussed patient with nursing.            ROS: 10 point ROS performed; Negative unless noted in HPI.      Medical History:   Diagnosis Date   • Cardiomegaly 03/11/2015     Cardiomegaly   • Essential (primary) hypertension 06/18/2015     Hypertension   • Other pulmonary embolism without acute cor pulmonale (Multi) 06/18/2015     Pulmonary embolism         Surgical History:   Procedure Laterality Date   • CT ABDOMEN PELVIS ANGIOGRAM W AND/OR WO IV CONTRAST   12/6/2016     CT ABDOMEN PELVIS ANGIOGRAM W AND/OR WO IV CONTRAST 12/6/2016 [facility] CLINICAL LEGACY   • MR HEAD ANGIO WO IV CONTRAST   4/30/2017     MR HEAD ANGIO WO IV CONTRAST 4/30/2017 [facility] CLINICAL LEGACY   • MR HEAD ANGIO WO IV CONTRAST   12/6/2016     MR HEAD ANGIO WO IV CONTRAST 12/6/2016 [facility] CLINICAL LEGACY   • MR NECK ANGIO WO IV CONTRAST   4/30/2017     MR NECK ANGIO WO IV CONTRAST 4/30/2017 [facility] CLINICAL LEGACY   • MR NECK ANGIO WO IV CONTRAST   12/6/2016     MR NECK ANGIO WO IV CONTRAST 12/6/2016 [facility] CLINICAL LEGACY       SOCIAL HISTORY:  .  Former smoker.  Recent heavy alcohol use.  Previous marijuana use.  No other illicit drug use.     FAMILY HISTORY:  Unavailable.       LABS:  BMP: Date: 12/30/2024 Na 141 K 4.3 Cr 1.50 BUN 18  "glucose 84 Ca 9.4 GFR 55  CBC: Date: 12/30/2024 WBC 7.79 Hgb 13 hematocrit 41 platelets 368    BMP: Date: 1/7/2025 Na 142 K 4.3 Cr 1.44 BUN 18 glucose 87 Ca 9.3 GFR 57  CBC: Date: 1/7/2025 WBC 6.54 Hgb 12.7 hematocrit 39.7 platelets 304    BMP: Date: 1/13/2025 Na 142 K 4.2 Cr 1.44 BUN 14 glucose 88 Ca 9.5 GFR 57  CBC: Date: 1/13/2025 WBC 6.44 Hgb 12.6 hematocrit 29.3 platelets 256    BMP: Date: 1/27/2025 Na 142 K not available Cr 1.29 BUN 15 glucose 65 Ca 9.4 GFR 65  CBC: Date: 1/27/2025 WBC 6 Hgb 13.1 hematocrit 41.8 platelets 220        Lab Results   Component Value Date    WBC 7.3 12/19/2024    HGB 12.4 (L) 12/19/2024    HCT 37.6 (L) 12/19/2024     12/19/2024    CHOL 178 12/16/2024    TRIG 92 12/16/2024    HDL 49.5 12/16/2024    ALT 23 12/16/2024    AST 88 (H) 12/16/2024     12/20/2024    K 3.8 12/20/2024     12/20/2024    CREATININE 1.56 (H) 12/20/2024    BUN 13 12/20/2024    CO2 28 12/20/2024    INR 1.1 12/16/2024    HGBA1C 6.2 (H) 12/16/2024             /90   Pulse 87   Temp 36.7 °C (98 °F)   Resp 16   Ht 1.676 m (5' 6\")   Wt 110 kg (242 lb 12.8 oz)   SpO2 97%   BMI 39.19 kg/m²      Physical Exam  Vitals and nursing note reviewed.   Constitutional:       General: He is awake.      Appearance: He is ill-appearing.   HENT:      Head: Normocephalic.      Right Ear: External ear normal.      Left Ear: External ear normal.      Nose: Nose normal.      Mouth/Throat:      Mouth: Mucous membranes are moist.      Pharynx: Oropharynx is clear.   Eyes:      Extraocular Movements: Extraocular movements intact.      Conjunctiva/sclera: Conjunctivae normal.      Pupils: Pupils are equal, round, and reactive to light.   Cardiovascular:      Rate and Rhythm: Normal rate and regular rhythm.      Pulses: Normal pulses.      Heart sounds: Normal heart sounds.   Pulmonary:      Effort: Pulmonary effort is normal.      Breath sounds: Normal breath sounds.   Abdominal:      General: Bowel sounds are " normal.      Palpations: Abdomen is soft.   Musculoskeletal:         General: Normal range of motion.      Cervical back: Normal range of motion and neck supple.   Skin:     General: Skin is warm and dry.   Neurological:      Mental Status: He is alert. He is confused.      Cranial Nerves: Cranial nerve deficit, dysarthria and facial asymmetry present.      Sensory: Sensation is intact.      Motor: Weakness present.      Coordination: Coordination abnormal.      Gait: Gait abnormal.      Comments: RUE/RLE hemiplegia;  mute/severe dysarthria;  + partial neglect.      Right facial droop   Psychiatric:         Mood and Affect: Affect is inappropriate.         Speech: He is noncommunicative.         Behavior: Behavior normal. Behavior is cooperative.         Cognition and Memory: Cognition is impaired.         Judgment: Judgment is inappropriate.          Assessment/Plan     Benign essential HTN:  Continue amlodipine, lisinopril and carvedilol.   Continue hydralazine PRN for SBP >150 and DBP >90.  Monitor Bps.    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    Patient was evaluated/treated in the hospital.   Continue Eliquis and aspirin.   His degree of aphasia did not fit with the L CAREN infarct seen on CTH.   He was evaluated by speech therapy in the hospital, he passed a swallow evaluation and discharged on   Regular diet, regular texture, thin consistency.     Asthma:  Continue Breo Ellipta and albuterol HFA inhaler PRN.  Continue RA.  Monitor for cough, SOB, wheezing.    Factor V Leiden:  Continue Eliquis.     Peripheral neuropathy:  Continue gabapentin.    Hyperlipidemia:  Continue atorvastatin.    Major depressive disorder:  Continue paroxetine.    History of L PCA stroke 2016 (w/ R HH and right hemiplegia),   R PCA stroke 7/2024 both in setting of non-adherent to xarelto.    Immobility; generalized weakness:  Patient unable to  ambulate.  Continue at [facility] skilled services.     Constipation:   Continue Miralax.   Monitor for constipation    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 (Multi)     Other Visit Diagnoses       Benign essential HTN    -  Primary    Right hemiplegia (Multi)        Expressive aphasia        Asthma, unspecified asthma severity, unspecified whether complicated, unspecified whether persistent ([facility])        Peripheral polyneuropathy        Hyperlipidemia, unspecified hyperlipidemia type        Major depressive disorder with current active episode, unspecified depression episode severity, unspecified whether recurrent        History of CVA (cerebrovascular accident)        Immobility        Muscle weakness (generalized)        Constipation, unspecified constipation type                  ANDI Figueroa       Electronically Signed By: ANDI Figueroa   2/2/25  8:02 PM

## 2025-02-02 VITALS
SYSTOLIC BLOOD PRESSURE: 121 MMHG | DIASTOLIC BLOOD PRESSURE: 90 MMHG | OXYGEN SATURATION: 97 % | RESPIRATION RATE: 16 BRPM | BODY MASS INDEX: 39.02 KG/M2 | HEIGHT: 66 IN | HEART RATE: 87 BPM | WEIGHT: 242.8 LBS | TEMPERATURE: 98 F

## 2025-02-03 NOTE — PROGRESS NOTES
Name: Michael Reed    YOB: 1970    Code Status: FULL CODE    Chief Complaint:  Follow up on HTN; etc....    HPI     55 year old right handed male with medical history of L PCA stroke 2016 (w/ R HH and right hemiplegia), R PCA stroke 7/2024 both in setting of non-adherent to xarelto, Factor V Leiden (non-adherence to xarelto), HTN, and HLD.    Patient was admitted to [facility] from 12/16/24 to 12/20/24.    Patient admitted to [facility] for skilled services on 12/20/24.      Diagnoses as follows:    Benign essential HTN:  On amlodipine, lisinopril and carvedilol.   Recent Bps: 121/90, 142/80, 164/98.  Bps elevated at times.  Reminded nursing to use hydralazine PRN med when Bps are elevated.   Also has hydralazine ordered PRN for SBP >150 and DBP >90.  Patient seen today, calm and cooperative.  He is sitting up in a wheelchair in his room.  Unable to answer questions due to severe expressive aphasia.  Patient at [facility] for skilled services.  Able to nod his head to answer questions.  No complaints of headaches, dizziness or chest pain.    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    Patient was evaluated/treated in the hospital.   Discharged on Eliquis and aspirin.   CT-head/CTA with L CAREN infarct and L A2 cut off.  He was not a candidate for thrombolysis as out of the window with established hypodensity and on DOAC.   His degree of aphasia did not fit with the L CAREN infarct seen on CTH.   He was evaluated by speech therapy in the hospital, he passed a swallow evaluation and discharged on   Regular diet, regular texture, thin consistency.     Asthma:  On Breo Ellipta and albuterol HFA inhaler PRN.  On RA.  No reports of cough, SOB or oxygen desaturations.     Factor V Leiden:  On Eliquis.     Peripheral neuropathy:  On gabapentin.    Hyperlipidemia:  On atorvastatin.    Major depressive  disorder:  On paroxetine.    History of L PCA stroke 2016 (w/ R HH and right hemiplegia),   R PCA stroke 7/2024 both in setting of non-adherent to xarelto.    Immobility; generalized weakness:  Patient unable to ambulate.  Very weak.  At [facility] for skilled services.     Constipation:   On Miralax.   No reports/complaints of constipation.                                 Reviewed  EMR  Reviewed medical, social, surgical and family history.  Reviewed all current medications and performed medication reconciliation.  Performed prescription drug management.  Reviewed vital signs AND lab results  Reviewed [facility] Documentation  Discussed patient with nursing.            ROS: 10 point ROS performed; Negative unless noted in HPI.      Medical History:   Diagnosis Date    Cardiomegaly 03/11/2015     Cardiomegaly    Essential (primary) hypertension 06/18/2015     Hypertension    Other pulmonary embolism without acute cor pulmonale (Multi) 06/18/2015     Pulmonary embolism         Surgical History:   Procedure Laterality Date    CT ABDOMEN PELVIS ANGIOGRAM W AND/OR WO IV CONTRAST   12/6/2016     CT ABDOMEN PELVIS ANGIOGRAM W AND/OR WO IV CONTRAST 12/6/2016 [facility] CLINICAL LEGACY    MR HEAD ANGIO WO IV CONTRAST   4/30/2017     MR HEAD ANGIO WO IV CONTRAST 4/30/2017 [facility] CLINICAL LEGACY    MR HEAD ANGIO WO IV CONTRAST   12/6/2016     MR HEAD ANGIO WO IV CONTRAST 12/6/2016 [facility] CLINICAL LEGACY    MR NECK ANGIO WO IV CONTRAST   4/30/2017     MR NECK ANGIO WO IV CONTRAST 4/30/2017 [facility] CLINICAL LEGACY    MR NECK ANGIO WO IV CONTRAST   12/6/2016     MR NECK ANGIO WO IV CONTRAST 12/6/2016 [facility] CLINICAL LEGACY       SOCIAL HISTORY:  .  Former smoker.  Recent heavy alcohol use.  Previous marijuana use.  No other illicit drug use.     FAMILY HISTORY:  Unavailable.       LABS:  BMP: Date: 12/30/2024 Na 141 K 4.3 Cr 1.50 BUN 18 glucose 84 Ca 9.4 GFR 55  CBC: Date: 12/30/2024 WBC 7.79 Hgb 13 hematocrit 41  "platelets 368    BMP: Date: 1/7/2025 Na 142 K 4.3 Cr 1.44 BUN 18 glucose 87 Ca 9.3 GFR 57  CBC: Date: 1/7/2025 WBC 6.54 Hgb 12.7 hematocrit 39.7 platelets 304    BMP: Date: 1/13/2025 Na 142 K 4.2 Cr 1.44 BUN 14 glucose 88 Ca 9.5 GFR 57  CBC: Date: 1/13/2025 WBC 6.44 Hgb 12.6 hematocrit 29.3 platelets 256    BMP: Date: 1/27/2025 Na 142 K not available Cr 1.29 BUN 15 glucose 65 Ca 9.4 GFR 65  CBC: Date: 1/27/2025 WBC 6 Hgb 13.1 hematocrit 41.8 platelets 220        Lab Results   Component Value Date    WBC 7.3 12/19/2024    HGB 12.4 (L) 12/19/2024    HCT 37.6 (L) 12/19/2024     12/19/2024    CHOL 178 12/16/2024    TRIG 92 12/16/2024    HDL 49.5 12/16/2024    ALT 23 12/16/2024    AST 88 (H) 12/16/2024     12/20/2024    K 3.8 12/20/2024     12/20/2024    CREATININE 1.56 (H) 12/20/2024    BUN 13 12/20/2024    CO2 28 12/20/2024    INR 1.1 12/16/2024    HGBA1C 6.2 (H) 12/16/2024             /90   Pulse 87   Temp 36.7 °C (98 °F)   Resp 16   Ht 1.676 m (5' 6\")   Wt 110 kg (242 lb 12.8 oz)   SpO2 97%   BMI 39.19 kg/m²      Physical Exam  Vitals and nursing note reviewed.   Constitutional:       General: He is awake.      Appearance: He is ill-appearing.   HENT:      Head: Normocephalic.      Right Ear: External ear normal.      Left Ear: External ear normal.      Nose: Nose normal.      Mouth/Throat:      Mouth: Mucous membranes are moist.      Pharynx: Oropharynx is clear.   Eyes:      Extraocular Movements: Extraocular movements intact.      Conjunctiva/sclera: Conjunctivae normal.      Pupils: Pupils are equal, round, and reactive to light.   Cardiovascular:      Rate and Rhythm: Normal rate and regular rhythm.      Pulses: Normal pulses.      Heart sounds: Normal heart sounds.   Pulmonary:      Effort: Pulmonary effort is normal.      Breath sounds: Normal breath sounds.   Abdominal:      General: Bowel sounds are normal.      Palpations: Abdomen is soft.   Musculoskeletal:         General: " Normal range of motion.      Cervical back: Normal range of motion and neck supple.   Skin:     General: Skin is warm and dry.   Neurological:      Mental Status: He is alert. He is confused.      Cranial Nerves: Cranial nerve deficit, dysarthria and facial asymmetry present.      Sensory: Sensation is intact.      Motor: Weakness present.      Coordination: Coordination abnormal.      Gait: Gait abnormal.      Comments: RUE/RLE hemiplegia;  mute/severe dysarthria;  + partial neglect.      Right facial droop   Psychiatric:         Mood and Affect: Affect is inappropriate.         Speech: He is noncommunicative.         Behavior: Behavior normal. Behavior is cooperative.         Cognition and Memory: Cognition is impaired.         Judgment: Judgment is inappropriate.          Assessment/Plan      Benign essential HTN:  Continue amlodipine, lisinopril and carvedilol.   Continue hydralazine PRN for SBP >150 and DBP >90.  Monitor Bps.    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    Patient was evaluated/treated in the hospital.   Continue Eliquis and aspirin.   His degree of aphasia did not fit with the L CAREN infarct seen on CTH.   He was evaluated by speech therapy in the hospital, he passed a swallow evaluation and discharged on   Regular diet, regular texture, thin consistency.     Asthma:  Continue Breo Ellipta and albuterol HFA inhaler PRN.  Continue RA.  Monitor for cough, SOB, wheezing.    Factor V Leiden:  Continue Eliquis.     Peripheral neuropathy:  Continue gabapentin.    Hyperlipidemia:  Continue atorvastatin.    Major depressive disorder:  Continue paroxetine.    History of L PCA stroke 2016 (w/ R HH and right hemiplegia),   R PCA stroke 7/2024 both in setting of non-adherent to xarelto.    Immobility; generalized weakness:  Patient unable to ambulate.  Continue at [facility] skilled services.      Constipation:   Continue Miralax.   Monitor for constipation    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 (Multi)     Other Visit Diagnoses       Benign essential HTN    -  Primary    Right hemiplegia (Multi)        Expressive aphasia        Asthma, unspecified asthma severity, unspecified whether complicated, unspecified whether persistent ([facility])        Peripheral polyneuropathy        Hyperlipidemia, unspecified hyperlipidemia type        Major depressive disorder with current active episode, unspecified depression episode severity, unspecified whether recurrent        History of CVA (cerebrovascular accident)        Immobility        Muscle weakness (generalized)        Constipation, unspecified constipation type                  Alejandra Mott, APRN-CNP

## 2025-08-21 ENCOUNTER — NURSING HOME VISIT (OUTPATIENT)
Dept: POST ACUTE CARE | Facility: EXTERNAL LOCATION | Age: 55
End: 2025-08-21
Payer: MEDICARE

## 2025-08-21 DIAGNOSIS — G62.9 PERIPHERAL POLYNEUROPATHY: ICD-10-CM

## 2025-08-21 DIAGNOSIS — I10 HYPERTENSION, UNSPECIFIED TYPE: Primary | ICD-10-CM

## 2025-08-21 DIAGNOSIS — H40.9 GLAUCOMA, UNSPECIFIED GLAUCOMA TYPE, UNSPECIFIED LATERALITY: ICD-10-CM

## 2025-08-21 DIAGNOSIS — J45.909 ASTHMA, UNSPECIFIED ASTHMA SEVERITY, UNSPECIFIED WHETHER COMPLICATED, UNSPECIFIED WHETHER PERSISTENT (HHS-HCC): ICD-10-CM

## 2025-08-21 DIAGNOSIS — F32.9 MAJOR DEPRESSIVE DISORDER, REMISSION STATUS UNSPECIFIED, UNSPECIFIED WHETHER RECURRENT: ICD-10-CM

## 2025-08-21 DIAGNOSIS — N40.0 BENIGN PROSTATIC HYPERPLASIA, UNSPECIFIED WHETHER LOWER URINARY TRACT SYMPTOMS PRESENT: ICD-10-CM

## 2025-08-21 DIAGNOSIS — I42.9 CARDIOMYOPATHY, UNSPECIFIED TYPE (MULTI): ICD-10-CM

## 2025-08-21 DIAGNOSIS — E78.5 HYPERLIPIDEMIA, UNSPECIFIED HYPERLIPIDEMIA TYPE: ICD-10-CM

## 2025-08-21 DIAGNOSIS — D68.51 FACTOR V LEIDEN MUTATION (MULTI): ICD-10-CM

## 2025-08-21 DIAGNOSIS — I69.920 APHASIA DUE TO LATE EFFECTS OF CEREBROVASCULAR DISEASE: ICD-10-CM

## 2025-08-21 DIAGNOSIS — I63.9 ARTERIAL ISCHEMIC STROKE (MULTI): ICD-10-CM

## 2025-08-21 DIAGNOSIS — F10.11 HISTORY OF ETOH ABUSE: ICD-10-CM

## 2025-08-23 PROBLEM — F10.11 HISTORY OF ETOH ABUSE: Status: ACTIVE | Noted: 2025-08-23

## 2025-08-23 PROBLEM — E78.5 HLD (HYPERLIPIDEMIA): Status: RESOLVED | Noted: 2025-08-23 | Resolved: 2025-08-23

## 2025-08-23 PROBLEM — F32.9 MDD (MAJOR DEPRESSIVE DISORDER): Status: ACTIVE | Noted: 2025-08-23

## 2025-08-23 PROBLEM — H40.9 GLAUCOMA: Status: ACTIVE | Noted: 2025-08-23

## 2025-08-23 PROBLEM — N40.0 BPH (BENIGN PROSTATIC HYPERPLASIA): Status: ACTIVE | Noted: 2025-08-23

## 2025-08-23 PROBLEM — E78.5 HLD (HYPERLIPIDEMIA): Status: ACTIVE | Noted: 2025-08-23

## 2025-08-23 PROBLEM — G62.9 PERIPHERAL NEUROPATHY: Status: ACTIVE | Noted: 2025-08-23

## 2025-08-23 PROBLEM — J45.909 ASTHMA: Status: ACTIVE | Noted: 2025-08-23

## 2025-08-23 PROBLEM — F10.10 ALCOHOL ABUSE: Chronic | Status: RESOLVED | Noted: 2024-12-20 | Resolved: 2025-08-23
